# Patient Record
Sex: FEMALE | Race: WHITE | Employment: PART TIME | URBAN - METROPOLITAN AREA
[De-identification: names, ages, dates, MRNs, and addresses within clinical notes are randomized per-mention and may not be internally consistent; named-entity substitution may affect disease eponyms.]

---
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
## 2019-05-14 ENCOUNTER — APPOINTMENT (OUTPATIENT)
Dept: CT IMAGING | Age: 19
End: 2019-05-14

## 2019-05-14 ENCOUNTER — HOSPITAL ENCOUNTER (EMERGENCY)
Age: 19
Discharge: HOME | End: 2019-05-14

## 2019-05-14 VITALS
HEIGHT: 63 IN | SYSTOLIC BLOOD PRESSURE: 98 MMHG | RESPIRATION RATE: 16 BRPM | BODY MASS INDEX: 32.78 KG/M2 | WEIGHT: 185 LBS | TEMPERATURE: 98.3 F | DIASTOLIC BLOOD PRESSURE: 60 MMHG | HEART RATE: 95 BPM | OXYGEN SATURATION: 98 %

## 2019-05-14 DIAGNOSIS — R10.30 LOWER ABDOMINAL PAIN: Primary | ICD-10-CM

## 2019-05-14 DIAGNOSIS — I88.9 LYMPHADENITIS: ICD-10-CM

## 2019-05-14 LAB
ANION GAP SERPL CALCULATED.3IONS-SCNC: 11 MMOL/L (ref 7–16)
BACTERIA: ABNORMAL /HPF
BASOPHILS ABSOLUTE: 0.05 E9/L (ref 0–0.2)
BASOPHILS RELATIVE PERCENT: 0.6 % (ref 0–2)
BILIRUBIN URINE: NEGATIVE
BLOOD, URINE: NEGATIVE
BUN BLDV-MCNC: 10 MG/DL (ref 6–20)
CALCIUM SERPL-MCNC: 8.5 MG/DL (ref 8.6–10.2)
CHLORIDE BLD-SCNC: 103 MMOL/L (ref 98–107)
CLARITY: CLEAR
CO2: 26 MMOL/L (ref 22–29)
COLOR: YELLOW
CREAT SERPL-MCNC: 0.6 MG/DL (ref 0.5–1)
EOSINOPHILS ABSOLUTE: 0.09 E9/L (ref 0.05–0.5)
EOSINOPHILS RELATIVE PERCENT: 1.1 % (ref 0–6)
GFR AFRICAN AMERICAN: >60
GFR NON-AFRICAN AMERICAN: >60 ML/MIN/1.73
GLUCOSE BLD-MCNC: 103 MG/DL (ref 74–99)
GLUCOSE URINE: NEGATIVE MG/DL
HCG(URINE) PREGNANCY TEST: NEGATIVE
HCT VFR BLD CALC: 35.2 % (ref 34–48)
HEMOGLOBIN: 10.6 G/DL (ref 11.5–15.5)
IMMATURE GRANULOCYTES #: 0.03 E9/L
IMMATURE GRANULOCYTES %: 0.4 % (ref 0–5)
KETONES, URINE: NEGATIVE MG/DL
LEUKOCYTE ESTERASE, URINE: ABNORMAL
LIPASE: 39 U/L (ref 13–60)
LYMPHOCYTES ABSOLUTE: 1.83 E9/L (ref 1.5–4)
LYMPHOCYTES RELATIVE PERCENT: 23.4 % (ref 20–42)
MCH RBC QN AUTO: 20.9 PG (ref 26–35)
MCHC RBC AUTO-ENTMCNC: 30.1 % (ref 32–34.5)
MCV RBC AUTO: 69.6 FL (ref 80–99.9)
MONOCYTES ABSOLUTE: 0.77 E9/L (ref 0.1–0.95)
MONOCYTES RELATIVE PERCENT: 9.8 % (ref 2–12)
NEUTROPHILS ABSOLUTE: 5.06 E9/L (ref 1.8–7.3)
NEUTROPHILS RELATIVE PERCENT: 64.7 % (ref 43–80)
NITRITE, URINE: NEGATIVE
PDW BLD-RTO: 17 FL (ref 11.5–15)
PH UA: 7 (ref 5–9)
PLATELET # BLD: 310 E9/L (ref 130–450)
PMV BLD AUTO: 11.6 FL (ref 7–12)
POTASSIUM SERPL-SCNC: 3.4 MMOL/L (ref 3.5–5)
PROTEIN UA: NEGATIVE MG/DL
RBC # BLD: 5.06 E12/L (ref 3.5–5.5)
RBC UA: ABNORMAL /HPF (ref 0–2)
SODIUM BLD-SCNC: 140 MMOL/L (ref 132–146)
SPECIFIC GRAVITY UA: 1.01 (ref 1–1.03)
UROBILINOGEN, URINE: 1 E.U./DL
WBC # BLD: 7.8 E9/L (ref 4.5–11.5)
WBC UA: ABNORMAL /HPF (ref 0–5)

## 2019-05-14 PROCEDURE — 81025 URINE PREGNANCY TEST: CPT

## 2019-05-14 PROCEDURE — 80048 BASIC METABOLIC PNL TOTAL CA: CPT

## 2019-05-14 PROCEDURE — 74177 CT ABD & PELVIS W/CONTRAST: CPT

## 2019-05-14 PROCEDURE — 81001 URINALYSIS AUTO W/SCOPE: CPT

## 2019-05-14 PROCEDURE — 83690 ASSAY OF LIPASE: CPT

## 2019-05-14 PROCEDURE — 96374 THER/PROPH/DIAG INJ IV PUSH: CPT

## 2019-05-14 PROCEDURE — 6360000002 HC RX W HCPCS: Performed by: NURSE PRACTITIONER

## 2019-05-14 PROCEDURE — 85025 COMPLETE CBC W/AUTO DIFF WBC: CPT

## 2019-05-14 PROCEDURE — 99284 EMERGENCY DEPT VISIT MOD MDM: CPT

## 2019-05-14 PROCEDURE — 6360000004 HC RX CONTRAST MEDICATION: Performed by: RADIOLOGY

## 2019-05-14 PROCEDURE — 96375 TX/PRO/DX INJ NEW DRUG ADDON: CPT

## 2019-05-14 PROCEDURE — 36415 COLL VENOUS BLD VENIPUNCTURE: CPT

## 2019-05-14 RX ORDER — NAPROXEN 500 MG/1
500 TABLET ORAL 2 TIMES DAILY
Qty: 14 TABLET | Refills: 0 | Status: SHIPPED | OUTPATIENT
Start: 2019-05-14 | End: 2019-10-03

## 2019-05-14 RX ORDER — ONDANSETRON 2 MG/ML
4 INJECTION INTRAMUSCULAR; INTRAVENOUS ONCE
Status: COMPLETED | OUTPATIENT
Start: 2019-05-14 | End: 2019-05-14

## 2019-05-14 RX ORDER — HYDROCODONE BITARTRATE AND ACETAMINOPHEN 5; 325 MG/1; MG/1
1 TABLET ORAL EVERY 6 HOURS PRN
Qty: 10 TABLET | Refills: 0 | Status: SHIPPED | OUTPATIENT
Start: 2019-05-14 | End: 2019-05-17

## 2019-05-14 RX ORDER — KETOROLAC TROMETHAMINE 30 MG/ML
30 INJECTION, SOLUTION INTRAMUSCULAR; INTRAVENOUS ONCE
Status: COMPLETED | OUTPATIENT
Start: 2019-05-14 | End: 2019-05-14

## 2019-05-14 RX ORDER — ONDANSETRON 4 MG/1
4 TABLET, FILM COATED ORAL EVERY 8 HOURS PRN
Qty: 12 TABLET | Refills: 0 | Status: SHIPPED | OUTPATIENT
Start: 2019-05-14 | End: 2019-05-19

## 2019-05-14 RX ORDER — PREDNISONE 10 MG/1
40 TABLET ORAL DAILY
Qty: 20 TABLET | Refills: 0 | Status: SHIPPED | OUTPATIENT
Start: 2019-05-14 | End: 2019-05-19

## 2019-05-14 RX ORDER — DEXAMETHASONE SODIUM PHOSPHATE 10 MG/ML
10 INJECTION, SOLUTION INTRAMUSCULAR; INTRAVENOUS ONCE
Status: COMPLETED | OUTPATIENT
Start: 2019-05-14 | End: 2019-05-14

## 2019-05-14 RX ORDER — METRONIDAZOLE 500 MG/1
500 TABLET ORAL 3 TIMES DAILY
Qty: 30 TABLET | Refills: 0 | Status: SHIPPED | OUTPATIENT
Start: 2019-05-14 | End: 2019-05-24

## 2019-05-14 RX ADMIN — ONDANSETRON HYDROCHLORIDE 4 MG: 2 SOLUTION INTRAMUSCULAR; INTRAVENOUS at 21:13

## 2019-05-14 RX ADMIN — IOPAMIDOL 100 ML: 755 INJECTION, SOLUTION INTRAVENOUS at 21:49

## 2019-05-14 RX ADMIN — DEXAMETHASONE SODIUM PHOSPHATE 10 MG: 10 INJECTION, SOLUTION INTRAMUSCULAR; INTRAVENOUS at 21:13

## 2019-05-14 RX ADMIN — KETOROLAC TROMETHAMINE 30 MG: 30 INJECTION INTRAMUSCULAR; INTRAVENOUS at 21:13

## 2019-05-14 ASSESSMENT — PAIN DESCRIPTION - ONSET: ONSET: ON-GOING

## 2019-05-14 ASSESSMENT — PAIN DESCRIPTION - FREQUENCY: FREQUENCY: CONTINUOUS

## 2019-05-14 ASSESSMENT — PAIN DESCRIPTION - PROGRESSION: CLINICAL_PROGRESSION: NOT CHANGED

## 2019-05-14 ASSESSMENT — PAIN DESCRIPTION - DESCRIPTORS: DESCRIPTORS: CONSTANT;SHARP

## 2019-05-14 ASSESSMENT — PAIN DESCRIPTION - LOCATION: LOCATION: ABDOMEN

## 2019-05-14 ASSESSMENT — PAIN DESCRIPTION - PAIN TYPE: TYPE: ACUTE PAIN

## 2019-05-14 ASSESSMENT — PAIN DESCRIPTION - ORIENTATION: ORIENTATION: LOWER;RIGHT

## 2019-05-14 ASSESSMENT — PAIN - FUNCTIONAL ASSESSMENT: PAIN_FUNCTIONAL_ASSESSMENT: ACTIVITIES ARE NOT PREVENTED

## 2019-05-14 ASSESSMENT — PAIN SCALES - GENERAL
PAINLEVEL_OUTOF10: 8
PAINLEVEL_OUTOF10: 8

## 2019-05-15 NOTE — ED PROVIDER NOTES
[facility]    Department of Emergency Medicine   ED  Provider Note  Admit Date/RoomTime: 2019  8:33 PM  ED Room:   MRN: [id number]  Chief Complaint       Abdominal Pain (lower right per patient \"it's always been there\" it is now getting worse per patient)    History of Present Illness   Source of history provided by:  patient. History/Exam Limitations: none. Costa Carter is a 22 y.o. old female who has a past medical history of:   Past Medical History:   Diagnosis Date    Anemia affecting pregnancy in third trimester 10/11/2016    presents to the emergency department by private vehicle, for complaints of gradual onset, still present aching, cramping, shooting, stabbing pain in the RLQ without radiation which began 3 month(s) prior to arrival.   There has been similar episodes in the past .  Since onset the symptoms have been intermittent and persistent. The pain is associated with abdominal pain and nothing pertinent. The pain is aggravated by none and relieved by none and nothing. There has been NO back pain. GYN History: irregular periods. STD History: no history of PID, STD's. No LMP recorded. Patient has had an implant. Current Pregnancy: NA. Birth Control: None. Gravid Status:     . ROS   Pertinent positives and negatives are stated within HPI, all other systems reviewed and are negative. History reviewed. No pertinent surgical history. Social History:  reports that she has never smoked. She has never used smokeless tobacco. She reports that she does not drink alcohol or use drugs. Family History: family history is not on file. Allergies: Patient has no known allergies.     Physical Exam           ED Triage Vitals   BP Temp Temp Source Heart Rate Resp SpO2 Height Weight   19   128/86 99.8 °F (37.7 °C) Oral 134 12 99 % 5' 3\" (1.6 m) 185 lb (83.9 kg) Oxygen Saturation Interpretation: Normal.    · General Appearance/Constitutional:  Alert, development consistent with age. · HEENT:  NC/NT. PERRLA. Airway patent. · Neck:  Supple. No lymphadenopathy. · Respiratory:  No retractions. Lungs Clear to auscultation and breath sounds equal.  · CV:  Regular rate and rhythm. · GI:  General Appearance: normal.         Bowel sounds: normal bowel sounds. Distension:  None. Tenderness: No abdominal tenderness. Liver: non-palpable and non-tender. Spleen:  non-palpable and non-tender. Pulsatile Mass: absent. Hernia:  no inguinal or femoral hernias noted. · Back: CVA Tenderness: No.  · : (chaperone present during examination). deferred. · Integument:  Normal turgor. Warm, dry, without visible rash, unless noted elsewhere. · Lymphatics: No edema, cap.refill <3sec. · Neurological:  Orientation age-appropriate. Motor functions intact.     Lab / Imaging Results   (All laboratory and radiology results have been personally reviewed by myself)  Labs:  Results for orders placed or performed during the hospital encounter of 05/14/19   Urinalysis   Result Value Ref Range    Color, UA Yellow Straw/Yellow    Clarity, UA Clear Clear    Glucose, Ur Negative Negative mg/dL    Bilirubin Urine Negative Negative    Ketones, Urine Negative Negative mg/dL    Specific Gravity, UA 1.015 1.005 - 1.030    Blood, Urine Negative Negative    pH, UA 7.0 5.0 - 9.0    Protein, UA Negative Negative mg/dL    Urobilinogen, Urine 1.0 <2.0 E.U./dL    Nitrite, Urine Negative Negative    Leukocyte Esterase, Urine SMALL (A) Negative   Pregnancy, Urine   Result Value Ref Range    HCG(Urine) Pregnancy Test NEGATIVE NEGATIVE   CBC Auto Differential   Result Value Ref Range    WBC 7.8 4.5 - 11.5 E9/L    RBC 5.06 3.50 - 5.50 E12/L    Hemoglobin 10.6 (L) 11.5 - 15.5 g/dL    Hematocrit 35.2 34.0 - 48.0 %    MCV 69.6 (L) 80.0 - 99.9 2113)   ondansetron [facility] injection 4 mg (4 mg Intravenous Given 5/14/19 2113)   iopamidol (ISOVUE-370) 76 % injection 100 mL (100 mLs Intravenous Given 5/14/19 2149)        Re-examination:  5/14/19       Time: 194    Patients symptoms are improving. Consults:   None    Procedures:   none    MDM:   Patient was treated for her abdominal pain and hydrated with IV fluid. Labs were unremarkable and a CT of the abdomen revealed an enteritis which was confirmed with the patient's symptoms of diarrhea and nausea. She is stable for discharge home and can follow up with her PCP. Counseling: The emergency provider has spoken with the patient and discussed todays results, in addition to providing specific details for the plan of care and counseling regarding the diagnosis and prognosis. Questions are answered at this time and they are agreeable with the plan . Assessment      1. Lower abdominal pain    2. Lymphadenitis      Plan   Discharge to home  Patient condition is stable    New Medications     Discharge Medication List as of 5/14/2019 10:22 PM      START taking these medications    Details   predniSONE (DELTASONE) 10 MG tablet Take 4 tablets by mouth daily for 5 days, Disp-20 tablet, R-0Print      metroNIDAZOLE (FLAGYL) 500 MG tablet Take 1 tablet by mouth 3 times daily for 10 days, Disp-30 tablet, R-0Print      HYDROcodone-acetaminophen (NORCO) 5-325 MG per tablet Take 1 tablet by mouth every 6 hours as needed for Pain for up to 3 days. Intended supply: 3 days.  Take lowest dose possible to manage pain, Disp-10 tablet, R-0Print      naproxen (NAPROSYN) 500 MG tablet Take 1 tablet by mouth 2 times daily for 7 days, Disp-14 tablet, R-0Print      ondansetron (ZOFRAN) 4 MG tablet Take 1 tablet by mouth every 8 hours as needed for Nausea or Vomiting, Disp-12 tablet, R-0Print           Electronically signed by JAMEEL Castillo CNP   DD: 5/14/19  **This report was transcribed using voice recognition software. Every effort was made to ensure accuracy; however, inadvertent computerized transcription errors may be present.   END OF ED PROVIDER NOTE      JAMEEL Greco - CNP  05/14/19 [address] [place] [address], JAMEEL - CNP  05/14/19 [id number]

## 2019-05-27 ENCOUNTER — HOSPITAL ENCOUNTER (EMERGENCY)
Age: 19
Discharge: HOME | End: 2019-05-27
Attending: EMERGENCY MEDICINE

## 2019-05-27 VITALS
HEART RATE: 102 BPM | TEMPERATURE: 99 F | RESPIRATION RATE: 16 BRPM | OXYGEN SATURATION: 98 % | HEIGHT: 63 IN | WEIGHT: 195 LBS | SYSTOLIC BLOOD PRESSURE: 112 MMHG | BODY MASS INDEX: 34.55 KG/M2 | DIASTOLIC BLOOD PRESSURE: 70 MMHG

## 2019-05-27 DIAGNOSIS — N30.00 ACUTE CYSTITIS WITHOUT HEMATURIA: ICD-10-CM

## 2019-05-27 DIAGNOSIS — R30.0 DYSURIA: Primary | ICD-10-CM

## 2019-05-27 DIAGNOSIS — K62.5 HEMORRHAGE OF RECTUM AND ANUS: ICD-10-CM

## 2019-05-27 LAB
BACTERIA: ABNORMAL /HPF
BILIRUBIN URINE: NEGATIVE
BLOOD, URINE: ABNORMAL
CLARITY: ABNORMAL
COLOR: YELLOW
EPITHELIAL CELLS, UA: ABNORMAL /HPF
GLUCOSE URINE: NEGATIVE MG/DL
HCG(URINE) PREGNANCY TEST: NEGATIVE
KETONES, URINE: NEGATIVE MG/DL
LEUKOCYTE ESTERASE, URINE: ABNORMAL
NITRITE, URINE: NEGATIVE
PH UA: 6 (ref 5–9)
PROTEIN UA: NEGATIVE MG/DL
RBC UA: ABNORMAL /HPF (ref 0–2)
SPECIFIC GRAVITY UA: 1.02 (ref 1–1.03)
UROBILINOGEN, URINE: 1 E.U./DL
WBC UA: ABNORMAL /HPF (ref 0–5)

## 2019-05-27 PROCEDURE — 87088 URINE BACTERIA CULTURE: CPT

## 2019-05-27 PROCEDURE — 81001 URINALYSIS AUTO W/SCOPE: CPT

## 2019-05-27 PROCEDURE — 99283 EMERGENCY DEPT VISIT LOW MDM: CPT

## 2019-05-27 PROCEDURE — 81025 URINE PREGNANCY TEST: CPT

## 2019-05-27 RX ORDER — NITROFURANTOIN 25; 75 MG/1; MG/1
100 CAPSULE ORAL 2 TIMES DAILY
Qty: 6 CAPSULE | Refills: 0 | Status: SHIPPED | OUTPATIENT
Start: 2019-05-27 | End: 2019-05-30

## 2019-05-27 ASSESSMENT — PAIN DESCRIPTION - PAIN TYPE: TYPE: ACUTE PAIN

## 2019-05-27 ASSESSMENT — PAIN DESCRIPTION - ORIENTATION: ORIENTATION: RIGHT;LEFT;LOWER

## 2019-05-27 ASSESSMENT — PAIN SCALES - GENERAL: PAINLEVEL_OUTOF10: 8

## 2019-05-27 ASSESSMENT — PAIN DESCRIPTION - LOCATION: LOCATION: ABDOMEN

## 2019-05-27 ASSESSMENT — PAIN DESCRIPTION - DESCRIPTORS: DESCRIPTORS: CRAMPING

## 2019-05-27 ASSESSMENT — PAIN DESCRIPTION - FREQUENCY: FREQUENCY: CONTINUOUS

## 2019-05-27 ASSESSMENT — PAIN DESCRIPTION - PROGRESSION: CLINICAL_PROGRESSION: GRADUALLY WORSENING

## 2019-05-27 NOTE — ED NOTES
Assisted Dr. Deion Shearer with rectal exam. Tolerated well. Hemoccult positive. Dr. Deion Shearer notified.       Bebe Fang RN  05/27/19 [id number]

## 2019-05-27 NOTE — ED PROVIDER NOTES
Normal      ---------------------------------------------------PHYSICAL EXAM--------------------------------------      Constitutional/General: Alert and oriented x3, well appearing, non toxic in NAD  Head: NC/AT  Eyes: PERRL, EOMI    Neck: Supple, full ROM, no meningeal signs  Pulmonary: Lungs clear to auscultation bilaterally, no wheezes, rales, or rhonchi. Not in respiratory distress  Cardiovascular:  Regular rate and rhythm, no murmurs, gallops, or rubs. 2+ distal pulses  Abdomen: Soft, non tender, non distended, rectal exam showed brown stool that tested heme positive and no active bleeding was noted  Extremities: Moves all extremities x 4. Warm and well perfused  Skin: warm and dry without rash  Neurologic: GCS 15,  Psych: Normal Affect      ------------------------------ ED COURSE/MEDICAL DECISION MAKING----------------------  Medications - No data to display      Medical Decision Making:          Counseling: The emergency provider has spoken with the patient and discussed todays results, in addition to providing specific details for the plan of care and counseling regarding the diagnosis and prognosis. Questions are answered at this time and they are agreeable with the plan.      --------------------------------- IMPRESSION AND DISPOSITION ---------------------------------    IMPRESSION  1. Dysuria    2. Acute cystitis without hematuria    3.  Hemorrhage of rectum and anus        DISPOSITION  Disposition: Discharge to home  Patient condition is stable                  Quin Maldonado MD  05/27/19 [id number]

## 2019-05-30 LAB — URINE CULTURE, ROUTINE: NORMAL

## 2019-10-03 ENCOUNTER — HOSPITAL ENCOUNTER (EMERGENCY)
Age: 19
Discharge: HOME | End: 2019-10-03
Attending: FAMILY MEDICINE

## 2019-10-03 VITALS
RESPIRATION RATE: 16 BRPM | WEIGHT: 195 LBS | HEIGHT: 63 IN | OXYGEN SATURATION: 100 % | BODY MASS INDEX: 34.55 KG/M2 | HEART RATE: 96 BPM | TEMPERATURE: 98.3 F | SYSTOLIC BLOOD PRESSURE: 122 MMHG | DIASTOLIC BLOOD PRESSURE: 76 MMHG

## 2019-10-03 DIAGNOSIS — S20.161A: Primary | ICD-10-CM

## 2019-10-03 DIAGNOSIS — N61.0 CELLULITIS OF RIGHT BREAST: ICD-10-CM
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
## 2019-10-03 PROCEDURE — 6370000000 HC RX 637 (ALT 250 FOR IP): Performed by: FAMILY MEDICINE

## 2019-10-03 PROCEDURE — 2500000003 HC RX 250 WO HCPCS

## 2019-10-03 PROCEDURE — 99281 EMR DPT VST MAYX REQ PHY/QHP: CPT

## 2019-10-03 RX ORDER — DIAPER,BRIEF,INFANT-TODD,DISP
EACH MISCELLANEOUS ONCE
Status: COMPLETED | OUTPATIENT
Start: 2019-10-03 | End: 2019-10-03

## 2019-10-03 RX ORDER — LIDOCAINE HYDROCHLORIDE AND EPINEPHRINE 10; 10 MG/ML; UG/ML
INJECTION, SOLUTION INFILTRATION; PERINEURAL
Status: COMPLETED
Start: 2019-10-03 | End: 2019-10-03

## 2019-10-03 RX ORDER — SULFAMETHOXAZOLE AND TRIMETHOPRIM 800; 160 MG/1; MG/1
1 TABLET ORAL 2 TIMES DAILY
Qty: 20 TABLET | Refills: 0 | Status: SHIPPED | OUTPATIENT
Start: 2019-10-03 | End: 2019-10-13

## 2019-10-03 RX ADMIN — LIDOCAINE HYDROCHLORIDE,EPINEPHRINE BITARTRATE 20 ML: 10; .01 INJECTION, SOLUTION INFILTRATION; PERINEURAL at 11:23

## 2019-10-03 RX ADMIN — BACITRACIN ZINC: 500 OINTMENT TOPICAL at 11:23

## 2019-10-03 ASSESSMENT — PAIN DESCRIPTION - ONSET: ONSET: SUDDEN

## 2019-10-03 ASSESSMENT — PAIN DESCRIPTION - PAIN TYPE: TYPE: ACUTE PAIN

## 2019-10-03 ASSESSMENT — PAIN DESCRIPTION - ORIENTATION: ORIENTATION: RIGHT

## 2019-10-03 ASSESSMENT — PAIN DESCRIPTION - PROGRESSION: CLINICAL_PROGRESSION: NOT CHANGED

## 2019-10-03 ASSESSMENT — PAIN DESCRIPTION - FREQUENCY: FREQUENCY: CONTINUOUS

## 2019-10-03 ASSESSMENT — PAIN SCALES - GENERAL: PAINLEVEL_OUTOF10: 6

## 2019-10-03 ASSESSMENT — PAIN DESCRIPTION - LOCATION: LOCATION: BREAST

## 2019-10-03 ASSESSMENT — PAIN DESCRIPTION - DESCRIPTORS: DESCRIPTORS: ACHING;CONSTANT;DISCOMFORT

## 2019-11-15 ENCOUNTER — HOSPITAL ENCOUNTER (INPATIENT)
Age: 19
LOS: 5 days | Discharge: HOME | DRG: 885 | End: 2019-11-20
Attending: EMERGENCY MEDICINE | Admitting: PSYCHIATRY & NEUROLOGY
Payer: MEDICAID

## 2019-11-15 DIAGNOSIS — T14.91XA SUICIDAL BEHAVIOR WITH ATTEMPTED SELF-INJURY (HCC): Primary | ICD-10-CM

## 2019-11-15 PROBLEM — R45.851 DEPRESSION WITH SUICIDAL IDEATION: Status: ACTIVE | Noted: 2019-11-15

## 2019-11-15 PROBLEM — F32.A DEPRESSION WITH SUICIDAL IDEATION: Status: ACTIVE | Noted: 2019-11-15

## 2019-11-15 LAB
ACETAMINOPHEN LEVEL: <5 MCG/ML (ref 10–30)
ALBUMIN SERPL-MCNC: 3.7 G/DL (ref 3.5–5.2)
ALP BLD-CCNC: 72 U/L (ref 35–104)
ALT SERPL-CCNC: 12 U/L (ref 0–32)
AMPHETAMINE SCREEN, URINE: NOT DETECTED
ANION GAP SERPL CALCULATED.3IONS-SCNC: 9 MMOL/L (ref 7–16)
ANISOCYTOSIS: ABNORMAL
AST SERPL-CCNC: 13 U/L (ref 0–31)
BARBITURATE SCREEN URINE: NOT DETECTED
BASOPHILS ABSOLUTE: 0 E9/L (ref 0–0.2)
BASOPHILS RELATIVE PERCENT: 0.5 % (ref 0–2)
BENZODIAZEPINE SCREEN, URINE: NOT DETECTED
BILIRUB SERPL-MCNC: 0.6 MG/DL (ref 0–1.2)
BUN BLDV-MCNC: 8 MG/DL (ref 6–20)
CALCIUM SERPL-MCNC: 8.9 MG/DL (ref 8.6–10.2)
CANNABINOID SCREEN URINE: POSITIVE
CHLORIDE BLD-SCNC: 107 MMOL/L (ref 98–107)
CO2: 24 MMOL/L (ref 22–29)
COCAINE METABOLITE SCREEN URINE: NOT DETECTED
CREAT SERPL-MCNC: 0.6 MG/DL (ref 0.5–1)
EOSINOPHILS ABSOLUTE: 0.25 E9/L (ref 0.05–0.5)
EOSINOPHILS RELATIVE PERCENT: 4.4 % (ref 0–6)
ETHANOL: <10 MG/DL (ref 0–0.08)
FENTANYL SCREEN, URINE: NOT DETECTED
GFR AFRICAN AMERICAN: >60
GFR NON-AFRICAN AMERICAN: >60 ML/MIN/1.73
GLUCOSE BLD-MCNC: 91 MG/DL (ref 74–99)
HCG(URINE) PREGNANCY TEST: NEGATIVE
HCT VFR BLD CALC: 34.4 % (ref 34–48)
HEMOGLOBIN: 9.8 G/DL (ref 11.5–15.5)
HYPOCHROMIA: ABNORMAL
LYMPHOCYTES ABSOLUTE: 1.88 E9/L (ref 1.5–4)
LYMPHOCYTES RELATIVE PERCENT: 32.5 % (ref 20–42)
Lab: ABNORMAL
MCH RBC QN AUTO: 19.2 PG (ref 26–35)
MCHC RBC AUTO-ENTMCNC: 28.5 % (ref 32–34.5)
MCV RBC AUTO: 67.3 FL (ref 80–99.9)
METHADONE SCREEN, URINE: NOT DETECTED
MONOCYTES ABSOLUTE: 0.23 E9/L (ref 0.1–0.95)
MONOCYTES RELATIVE PERCENT: 4.4 % (ref 2–12)
NEUTROPHILS ABSOLUTE: 3.36 E9/L (ref 1.8–7.3)
NEUTROPHILS RELATIVE PERCENT: 58.8 % (ref 43–80)
OPIATE SCREEN URINE: NOT DETECTED
OVALOCYTES: ABNORMAL
OXYCODONE URINE: NOT DETECTED
PDW BLD-RTO: 18.5 FL (ref 11.5–15)
PHENCYCLIDINE SCREEN URINE: NOT DETECTED
PLATELET # BLD: 377 E9/L (ref 130–450)
PMV BLD AUTO: 11.5 FL (ref 7–12)
POIKILOCYTES: ABNORMAL
POLYCHROMASIA: ABNORMAL
POTASSIUM REFLEX MAGNESIUM: 3.9 MMOL/L (ref 3.5–5)
RBC # BLD: 5.11 E12/L (ref 3.5–5.5)
SALICYLATE, SERUM: <0.3 MG/DL (ref 0–30)
SODIUM BLD-SCNC: 140 MMOL/L (ref 132–146)
TOTAL PROTEIN: 6.3 G/DL (ref 6.4–8.3)
TRICYCLIC ANTIDEPRESSANTS SCREEN SERUM: NEGATIVE NG/ML
WBC # BLD: 5.7 E9/L (ref 4.5–11.5)

## 2019-11-15 PROCEDURE — 1240000000 HC EMOTIONAL WELLNESS R&B

## 2019-11-15 PROCEDURE — G0480 DRUG TEST DEF 1-7 CLASSES: HCPCS

## 2019-11-15 PROCEDURE — 99285 EMERGENCY DEPT VISIT HI MDM: CPT

## 2019-11-15 PROCEDURE — 80053 COMPREHEN METABOLIC PANEL: CPT

## 2019-11-15 PROCEDURE — 85025 COMPLETE CBC W/AUTO DIFF WBC: CPT

## 2019-11-15 PROCEDURE — 36415 COLL VENOUS BLD VENIPUNCTURE: CPT

## 2019-11-15 PROCEDURE — 81025 URINE PREGNANCY TEST: CPT

## 2019-11-15 PROCEDURE — 80307 DRUG TEST PRSMV CHEM ANLYZR: CPT

## 2019-11-15 RX ORDER — MAGNESIUM HYDROXIDE/ALUMINUM HYDROXICE/SIMETHICONE 120; 1200; 1200 MG/30ML; MG/30ML; MG/30ML
30 SUSPENSION ORAL PRN
Status: DISCONTINUED | OUTPATIENT
Start: 2019-11-15 | End: 2019-11-20 | Stop reason: HOSPADM

## 2019-11-15 RX ORDER — BENZTROPINE MESYLATE 1 MG/ML
2 INJECTION INTRAMUSCULAR; INTRAVENOUS 2 TIMES DAILY PRN
Status: DISCONTINUED | OUTPATIENT
Start: 2019-11-15 | End: 2019-11-20 | Stop reason: HOSPADM

## 2019-11-15 RX ORDER — OLANZAPINE 5 MG/1
5 TABLET ORAL EVERY 4 HOURS PRN
Status: DISCONTINUED | OUTPATIENT
Start: 2019-11-15 | End: 2019-11-20 | Stop reason: HOSPADM

## 2019-11-15 RX ORDER — OLANZAPINE 10 MG/1
10 INJECTION, POWDER, LYOPHILIZED, FOR SOLUTION INTRAMUSCULAR EVERY 4 HOURS PRN
Status: DISCONTINUED | OUTPATIENT
Start: 2019-11-15 | End: 2019-11-20 | Stop reason: HOSPADM

## 2019-11-15 RX ORDER — HYDROXYZINE PAMOATE 50 MG/1
50 CAPSULE ORAL 3 TIMES DAILY PRN
Status: DISCONTINUED | OUTPATIENT
Start: 2019-11-15 | End: 2019-11-20 | Stop reason: HOSPADM

## 2019-11-15 RX ORDER — ACETAMINOPHEN 325 MG/1
650 TABLET ORAL EVERY 4 HOURS PRN
Status: DISCONTINUED | OUTPATIENT
Start: 2019-11-15 | End: 2019-11-20 | Stop reason: HOSPADM

## 2019-11-15 RX ORDER — TRAZODONE HYDROCHLORIDE 50 MG/1
50 TABLET ORAL NIGHTLY PRN
Status: DISCONTINUED | OUTPATIENT
Start: 2019-11-15 | End: 2019-11-20 | Stop reason: HOSPADM

## 2019-11-15 ASSESSMENT — ENCOUNTER SYMPTOMS
VOMITING: 0
SHORTNESS OF BREATH: 0
EYE PAIN: 0
WHEEZING: 0
DIARRHEA: 0
COUGH: 0
EYE REDNESS: 0
SORE THROAT: 0
ABDOMINAL DISTENTION: 0
NAUSEA: 0
SINUS PRESSURE: 0
EYE DISCHARGE: 0
BACK PAIN: 0

## 2019-11-15 ASSESSMENT — SLEEP AND FATIGUE QUESTIONNAIRES
AVERAGE NUMBER OF SLEEP HOURS: 5
DO YOU USE A SLEEP AID: YES
SLEEP PATTERN: DIFFICULTY FALLING ASLEEP;RESTLESSNESS;DISTURBED/INTERRUPTED SLEEP
DIFFICULTY FALLING ASLEEP: YES
DIFFICULTY ARISING: YES
DIFFICULTY STAYING ASLEEP: YES
RESTFUL SLEEP: NO
DO YOU HAVE DIFFICULTY SLEEPING: YES

## 2019-11-15 ASSESSMENT — PATIENT HEALTH QUESTIONNAIRE - PHQ9: SUM OF ALL RESPONSES TO PHQ QUESTIONS 1-9: 18

## 2019-11-15 ASSESSMENT — LIFESTYLE VARIABLES: HISTORY_ALCOHOL_USE: NO

## 2019-11-16 PROBLEM — F33.3 SEVERE EPISODE OF RECURRENT MAJOR DEPRESSIVE DISORDER, WITH PSYCHOTIC FEATURES (HCC): Status: ACTIVE | Noted: 2019-11-16

## 2019-11-16 PROBLEM — F32.9 MAJOR DEPRESSIVE DISORDER, SINGLE EPISODE: Status: ACTIVE | Noted: 2019-11-16

## 2019-11-16 PROCEDURE — 6370000000 HC RX 637 (ALT 250 FOR IP): Performed by: PSYCHIATRY & NEUROLOGY

## 2019-11-16 PROCEDURE — 1240000000 HC EMOTIONAL WELLNESS R&B

## 2019-11-16 PROCEDURE — 99222 1ST HOSP IP/OBS MODERATE 55: CPT | Performed by: NURSE PRACTITIONER

## 2019-11-16 PROCEDURE — 6370000000 HC RX 637 (ALT 250 FOR IP): Performed by: NURSE PRACTITIONER

## 2019-11-16 RX ORDER — PAROXETINE 10 MG/1
10 TABLET, FILM COATED ORAL DAILY
Status: DISCONTINUED | OUTPATIENT
Start: 2019-11-16 | End: 2019-11-17

## 2019-11-16 RX ADMIN — TRAZODONE HYDROCHLORIDE 50 MG: 50 TABLET ORAL at 20:26

## 2019-11-16 RX ADMIN — ACETAMINOPHEN 650 MG: 325 TABLET, FILM COATED ORAL at 16:15

## 2019-11-16 RX ADMIN — HYDROXYZINE PAMOATE 50 MG: 50 CAPSULE ORAL at 20:26

## 2019-11-16 RX ADMIN — PAROXETINE 10 MG: 10 TABLET, FILM COATED ORAL at 12:49

## 2019-11-16 ASSESSMENT — PATIENT HEALTH QUESTIONNAIRE - PHQ9: SUM OF ALL RESPONSES TO PHQ QUESTIONS 1-9: 21

## 2019-11-16 ASSESSMENT — SLEEP AND FATIGUE QUESTIONNAIRES
RESTFUL SLEEP: NO
SLEEP PATTERN: DIFFICULTY FALLING ASLEEP
DO YOU HAVE DIFFICULTY SLEEPING: YES
DIFFICULTY ARISING: YES
AVERAGE NUMBER OF SLEEP HOURS: 5
DIFFICULTY STAYING ASLEEP: YES
DIFFICULTY FALLING ASLEEP: YES
DO YOU USE A SLEEP AID: YES

## 2019-11-16 ASSESSMENT — PAIN SCALES - GENERAL: PAINLEVEL_OUTOF10: 4

## 2019-11-16 ASSESSMENT — LIFESTYLE VARIABLES: HISTORY_ALCOHOL_USE: NO

## 2019-11-17 LAB
CHOLESTEROL, TOTAL: 122 MG/DL (ref 0–199)
HBA1C MFR BLD: 5 % (ref 4–5.6)
HDLC SERPL-MCNC: 32 MG/DL
LDL CHOLESTEROL CALCULATED: 57 MG/DL (ref 0–99)
TRIGL SERPL-MCNC: 164 MG/DL (ref 0–149)
VLDLC SERPL CALC-MCNC: 33 MG/DL

## 2019-11-17 PROCEDURE — 36415 COLL VENOUS BLD VENIPUNCTURE: CPT

## 2019-11-17 PROCEDURE — 83036 HEMOGLOBIN GLYCOSYLATED A1C: CPT

## 2019-11-17 PROCEDURE — 6370000000 HC RX 637 (ALT 250 FOR IP): Performed by: PSYCHIATRY & NEUROLOGY

## 2019-11-17 PROCEDURE — 1240000000 HC EMOTIONAL WELLNESS R&B

## 2019-11-17 PROCEDURE — 6370000000 HC RX 637 (ALT 250 FOR IP): Performed by: NURSE PRACTITIONER

## 2019-11-17 PROCEDURE — 99232 SBSQ HOSP IP/OBS MODERATE 35: CPT | Performed by: NURSE PRACTITIONER

## 2019-11-17 PROCEDURE — 80061 LIPID PANEL: CPT

## 2019-11-17 RX ORDER — PAROXETINE HYDROCHLORIDE 20 MG/1
20 TABLET, FILM COATED ORAL DAILY
Status: DISCONTINUED | OUTPATIENT
Start: 2019-11-18 | End: 2019-11-20 | Stop reason: HOSPADM

## 2019-11-17 RX ADMIN — PAROXETINE 10 MG: 10 TABLET, FILM COATED ORAL at 09:30

## 2019-11-17 RX ADMIN — TRAZODONE HYDROCHLORIDE 50 MG: 50 TABLET ORAL at 21:06

## 2019-11-17 RX ADMIN — ALUMINUM HYDROXIDE, MAGNESIUM HYDROXIDE, AND SIMETHICONE 30 ML: 200; 200; 20 SUSPENSION ORAL at 15:44

## 2019-11-17 RX ADMIN — HYDROXYZINE PAMOATE 50 MG: 50 CAPSULE ORAL at 21:06

## 2019-11-18 PROCEDURE — 99231 SBSQ HOSP IP/OBS SF/LOW 25: CPT | Performed by: NURSE PRACTITIONER

## 2019-11-18 PROCEDURE — 6370000000 HC RX 637 (ALT 250 FOR IP): Performed by: PSYCHIATRY & NEUROLOGY

## 2019-11-18 PROCEDURE — 6370000000 HC RX 637 (ALT 250 FOR IP): Performed by: NURSE PRACTITIONER

## 2019-11-18 PROCEDURE — 1240000000 HC EMOTIONAL WELLNESS R&B

## 2019-11-18 RX ADMIN — ALUMINUM HYDROXIDE, MAGNESIUM HYDROXIDE, AND SIMETHICONE 30 ML: 200; 200; 20 SUSPENSION ORAL at 10:23

## 2019-11-18 RX ADMIN — ALUMINUM HYDROXIDE, MAGNESIUM HYDROXIDE, AND SIMETHICONE 30 ML: 200; 200; 20 SUSPENSION ORAL at 16:12

## 2019-11-18 RX ADMIN — ALUMINUM HYDROXIDE, MAGNESIUM HYDROXIDE, AND SIMETHICONE 30 ML: 200; 200; 20 SUSPENSION ORAL at 23:54

## 2019-11-18 RX ADMIN — PAROXETINE HYDROCHLORIDE 20 MG: 20 TABLET, FILM COATED ORAL at 08:57

## 2019-11-18 RX ADMIN — TRAZODONE HYDROCHLORIDE 50 MG: 50 TABLET ORAL at 20:12

## 2019-11-18 RX ADMIN — HYDROXYZINE PAMOATE 50 MG: 50 CAPSULE ORAL at 20:12

## 2019-11-18 ASSESSMENT — PAIN SCALES - GENERAL
PAINLEVEL_OUTOF10: 0

## 2019-11-19 PROCEDURE — 6370000000 HC RX 637 (ALT 250 FOR IP): Performed by: NURSE PRACTITIONER

## 2019-11-19 PROCEDURE — 1240000000 HC EMOTIONAL WELLNESS R&B

## 2019-11-19 PROCEDURE — 99232 SBSQ HOSP IP/OBS MODERATE 35: CPT | Performed by: NURSE PRACTITIONER

## 2019-11-19 PROCEDURE — 6370000000 HC RX 637 (ALT 250 FOR IP): Performed by: PSYCHIATRY & NEUROLOGY

## 2019-11-19 RX ORDER — ARIPIPRAZOLE 2 MG/1
2 TABLET ORAL 2 TIMES DAILY
Qty: 30 TABLET | Refills: 0 | Status: SHIPPED | OUTPATIENT
Start: 2019-11-19 | End: 2020-11-05

## 2019-11-19 RX ORDER — PAROXETINE HYDROCHLORIDE 20 MG/1
20 TABLET, FILM COATED ORAL DAILY
Qty: 30 TABLET | Refills: 0 | Status: SHIPPED | OUTPATIENT
Start: 2019-11-20 | End: 2020-11-05

## 2019-11-19 RX ORDER — HYDROXYZINE PAMOATE 50 MG/1
50 CAPSULE ORAL 3 TIMES DAILY PRN
Qty: 30 CAPSULE | Refills: 0 | Status: SHIPPED | OUTPATIENT
Start: 2019-11-19 | End: 2019-11-29

## 2019-11-19 RX ORDER — ARIPIPRAZOLE 2 MG/1
2 TABLET ORAL 2 TIMES DAILY
Status: DISCONTINUED | OUTPATIENT
Start: 2019-11-19 | End: 2019-11-20 | Stop reason: HOSPADM

## 2019-11-19 RX ORDER — TRAZODONE HYDROCHLORIDE 50 MG/1
50 TABLET ORAL NIGHTLY PRN
Qty: 15 TABLET | Refills: 0 | Status: SHIPPED | OUTPATIENT
Start: 2019-11-19 | End: 2020-11-05

## 2019-11-19 RX ADMIN — TRAZODONE HYDROCHLORIDE 50 MG: 50 TABLET ORAL at 21:54

## 2019-11-19 RX ADMIN — PAROXETINE HYDROCHLORIDE 20 MG: 20 TABLET, FILM COATED ORAL at 08:33

## 2019-11-19 RX ADMIN — ACETAMINOPHEN 650 MG: 325 TABLET, FILM COATED ORAL at 13:03

## 2019-11-19 RX ADMIN — ARIPIPRAZOLE 2 MG: 2 TABLET ORAL at 21:54

## 2019-11-19 RX ADMIN — ARIPIPRAZOLE 2 MG: 2 TABLET ORAL at 09:46

## 2019-11-19 ASSESSMENT — PAIN DESCRIPTION - DESCRIPTORS: DESCRIPTORS: ACHING

## 2019-11-19 ASSESSMENT — PAIN SCALES - GENERAL
PAINLEVEL_OUTOF10: 0
PAINLEVEL_OUTOF10: 9
PAINLEVEL_OUTOF10: 0

## 2019-11-19 ASSESSMENT — PAIN DESCRIPTION - ONSET: ONSET: ON-GOING

## 2019-11-19 ASSESSMENT — PAIN DESCRIPTION - LOCATION: LOCATION: HIP

## 2019-11-19 ASSESSMENT — PAIN DESCRIPTION - PAIN TYPE: TYPE: ACUTE PAIN

## 2019-11-19 ASSESSMENT — PAIN DESCRIPTION - ORIENTATION: ORIENTATION: LEFT

## 2019-11-19 ASSESSMENT — PAIN DESCRIPTION - PROGRESSION: CLINICAL_PROGRESSION: NOT CHANGED

## 2019-11-20 VITALS
HEIGHT: 63 IN | DIASTOLIC BLOOD PRESSURE: 79 MMHG | BODY MASS INDEX: 31.89 KG/M2 | WEIGHT: 180 LBS | TEMPERATURE: 98.8 F | HEART RATE: 90 BPM | OXYGEN SATURATION: 100 % | SYSTOLIC BLOOD PRESSURE: 109 MMHG | RESPIRATION RATE: 15 BRPM

## 2019-11-20 PROCEDURE — 6370000000 HC RX 637 (ALT 250 FOR IP): Performed by: NURSE PRACTITIONER

## 2019-11-20 PROCEDURE — 99232 SBSQ HOSP IP/OBS MODERATE 35: CPT | Performed by: PSYCHIATRY & NEUROLOGY

## 2019-11-20 PROCEDURE — 6370000000 HC RX 637 (ALT 250 FOR IP): Performed by: PSYCHIATRY & NEUROLOGY

## 2019-11-20 RX ADMIN — ARIPIPRAZOLE 2 MG: 2 TABLET ORAL at 08:46

## 2019-11-20 RX ADMIN — PAROXETINE HYDROCHLORIDE 20 MG: 20 TABLET, FILM COATED ORAL at 08:46

## 2019-11-20 ASSESSMENT — PAIN SCALES - GENERAL: PAINLEVEL_OUTOF10: 0

## 2020-10-01 ENCOUNTER — HOSPITAL ENCOUNTER (EMERGENCY)
Age: 20
Discharge: HOME | End: 2020-10-01
Payer: MEDICARE

## 2020-10-01 VITALS
WEIGHT: 189 LBS | RESPIRATION RATE: 18 BRPM | DIASTOLIC BLOOD PRESSURE: 88 MMHG | BODY MASS INDEX: 33.49 KG/M2 | HEART RATE: 120 BPM | OXYGEN SATURATION: 98 % | HEIGHT: 63 IN | TEMPERATURE: 97.8 F | SYSTOLIC BLOOD PRESSURE: 141 MMHG

## 2020-10-01 LAB
ALBUMIN SERPL-MCNC: 3.5 G/DL (ref 3.5–5.2)
ALP BLD-CCNC: 52 U/L (ref 35–104)
ALT SERPL-CCNC: 9 U/L (ref 0–32)
ANION GAP SERPL CALCULATED.3IONS-SCNC: 12 MMOL/L (ref 7–16)
AST SERPL-CCNC: 12 U/L (ref 0–31)
BACTERIA: ABNORMAL /HPF
BASOPHILS ABSOLUTE: 0.04 E9/L (ref 0–0.2)
BASOPHILS RELATIVE PERCENT: 0.4 % (ref 0–2)
BILIRUB SERPL-MCNC: 0.4 MG/DL (ref 0–1.2)
BILIRUBIN URINE: NEGATIVE
BLOOD, URINE: NEGATIVE
BUN BLDV-MCNC: 6 MG/DL (ref 6–20)
CALCIUM SERPL-MCNC: 8.6 MG/DL (ref 8.6–10.2)
CHLORIDE BLD-SCNC: 103 MMOL/L (ref 98–107)
CLARITY: CLEAR
CO2: 21 MMOL/L (ref 22–29)
COLOR: YELLOW
CREAT SERPL-MCNC: 0.5 MG/DL (ref 0.5–1)
EOSINOPHILS ABSOLUTE: 0.05 E9/L (ref 0.05–0.5)
EOSINOPHILS RELATIVE PERCENT: 0.5 % (ref 0–6)
EPITHELIAL CELLS, UA: ABNORMAL /HPF
GFR AFRICAN AMERICAN: >60
GFR NON-AFRICAN AMERICAN: >60 ML/MIN/1.73
GLUCOSE BLD-MCNC: 131 MG/DL (ref 74–99)
GLUCOSE URINE: NEGATIVE MG/DL
GONADOTROPIN, CHORIONIC (HCG) QUANT: ABNORMAL MIU/ML
HCG, URINE, POC: POSITIVE
HCT VFR BLD CALC: 34.1 % (ref 34–48)
HEMOGLOBIN: 10.1 G/DL (ref 11.5–15.5)
IMMATURE GRANULOCYTES #: 0.03 E9/L
IMMATURE GRANULOCYTES %: 0.3 % (ref 0–5)
KETONES, URINE: 15 MG/DL
LEUKOCYTE ESTERASE, URINE: ABNORMAL
LYMPHOCYTES ABSOLUTE: 2.02 E9/L (ref 1.5–4)
LYMPHOCYTES RELATIVE PERCENT: 18.4 % (ref 20–42)
Lab: NORMAL
MCH RBC QN AUTO: 20.6 PG (ref 26–35)
MCHC RBC AUTO-ENTMCNC: 29.6 % (ref 32–34.5)
MCV RBC AUTO: 69.5 FL (ref 80–99.9)
MONOCYTES ABSOLUTE: 0.69 E9/L (ref 0.1–0.95)
MONOCYTES RELATIVE PERCENT: 6.3 % (ref 2–12)
NEGATIVE QC PASS/FAIL: NORMAL
NEUTROPHILS ABSOLUTE: 8.12 E9/L (ref 1.8–7.3)
NEUTROPHILS RELATIVE PERCENT: 74.1 % (ref 43–80)
NITRITE, URINE: NEGATIVE
PDW BLD-RTO: 18.1 FL (ref 11.5–15)
PH UA: 7 (ref 5–9)
PLATELET # BLD: 402 E9/L (ref 130–450)
PMV BLD AUTO: 11.4 FL (ref 7–12)
POSITIVE QC PASS/FAIL: NORMAL
POTASSIUM SERPL-SCNC: 3.6 MMOL/L (ref 3.5–5)
PROTEIN UA: ABNORMAL MG/DL
RBC # BLD: 4.91 E12/L (ref 3.5–5.5)
RBC UA: ABNORMAL /HPF (ref 0–2)
SODIUM BLD-SCNC: 136 MMOL/L (ref 132–146)
SPECIFIC GRAVITY UA: 1.02 (ref 1–1.03)
TOTAL PROTEIN: 6.2 G/DL (ref 6.4–8.3)
UROBILINOGEN, URINE: 1 E.U./DL
WBC # BLD: 11 E9/L (ref 4.5–11.5)
WBC UA: ABNORMAL /HPF (ref 0–5)

## 2020-10-01 PROCEDURE — 96374 THER/PROPH/DIAG INJ IV PUSH: CPT

## 2020-10-01 PROCEDURE — 99283 EMERGENCY DEPT VISIT LOW MDM: CPT

## 2020-10-01 PROCEDURE — 6360000002 HC RX W HCPCS: Performed by: PHYSICIAN ASSISTANT

## 2020-10-01 PROCEDURE — 80053 COMPREHEN METABOLIC PANEL: CPT

## 2020-10-01 PROCEDURE — 99284 EMERGENCY DEPT VISIT MOD MDM: CPT

## 2020-10-01 PROCEDURE — 85025 COMPLETE CBC W/AUTO DIFF WBC: CPT

## 2020-10-01 PROCEDURE — 84702 CHORIONIC GONADOTROPIN TEST: CPT

## 2020-10-01 PROCEDURE — 2580000003 HC RX 258: Performed by: PHYSICIAN ASSISTANT

## 2020-10-01 PROCEDURE — 81001 URINALYSIS AUTO W/SCOPE: CPT

## 2020-10-01 RX ORDER — METOCLOPRAMIDE HYDROCHLORIDE 5 MG/ML
5 INJECTION INTRAMUSCULAR; INTRAVENOUS ONCE
Status: COMPLETED | OUTPATIENT
Start: 2020-10-01 | End: 2020-10-01

## 2020-10-01 RX ORDER — 0.9 % SODIUM CHLORIDE 0.9 %
1000 INTRAVENOUS SOLUTION INTRAVENOUS ONCE
Status: COMPLETED | OUTPATIENT
Start: 2020-10-01 | End: 2020-10-01

## 2020-10-01 RX ORDER — NITROFURANTOIN 25; 75 MG/1; MG/1
100 CAPSULE ORAL 2 TIMES DAILY
Qty: 14 CAPSULE | Refills: 0 | Status: SHIPPED | OUTPATIENT
Start: 2020-10-01 | End: 2020-10-08

## 2020-10-01 RX ORDER — METOCLOPRAMIDE 10 MG/1
10 TABLET ORAL 3 TIMES DAILY PRN
Qty: 30 TABLET | Refills: 0 | Status: SHIPPED | OUTPATIENT
Start: 2020-10-01 | End: 2020-11-05

## 2020-10-01 RX ADMIN — METOCLOPRAMIDE HYDROCHLORIDE 5 MG: 5 INJECTION INTRAMUSCULAR; INTRAVENOUS at 17:17

## 2020-10-01 RX ADMIN — SODIUM CHLORIDE 1000 ML: 9 INJECTION, SOLUTION INTRAVENOUS at 17:17

## 2020-10-01 NOTE — ED NOTES
Discharge instructions given, medications and follow up instructions reviewed. Patient verbalized understanding, no other noted or stated problems at this time. Patient will follow up with physicians as directed.       Owen Pete RN  10/01/20 4752

## 2020-12-29 ENCOUNTER — HOSPITAL ENCOUNTER (OUTPATIENT)
Age: 20
Discharge: HOME | End: 2020-12-29
Attending: OBSTETRICS & GYNECOLOGY | Admitting: OBSTETRICS & GYNECOLOGY
Payer: MEDICAID

## 2020-12-29 VITALS — TEMPERATURE: 98.2 F | HEIGHT: 64 IN | WEIGHT: 213 LBS | BODY MASS INDEX: 36.37 KG/M2

## 2020-12-29 PROBLEM — O23.42 UTI (URINARY TRACT INFECTION) DURING PREGNANCY, SECOND TRIMESTER: Status: ACTIVE | Noted: 2020-12-29

## 2020-12-29 LAB
AMORPHOUS: ABNORMAL
BACTERIA: ABNORMAL /HPF
BILIRUBIN URINE: NEGATIVE
BLOOD, URINE: NEGATIVE
CLARITY: CLEAR
COLOR: YELLOW
EPITHELIAL CELLS, UA: ABNORMAL /HPF
GLUCOSE URINE: NEGATIVE MG/DL
KETONES, URINE: NEGATIVE MG/DL
LEUKOCYTE ESTERASE, URINE: ABNORMAL
NITRITE, URINE: NEGATIVE
PH UA: 7 (ref 5–9)
PROTEIN UA: NEGATIVE MG/DL
RBC UA: ABNORMAL /HPF (ref 0–2)
SPECIFIC GRAVITY UA: 1.02 (ref 1–1.03)
UROBILINOGEN, URINE: 0.2 E.U./DL
WBC UA: ABNORMAL /HPF (ref 0–5)

## 2020-12-29 PROCEDURE — 2580000003 HC RX 258

## 2020-12-29 PROCEDURE — 6360000002 HC RX W HCPCS: Performed by: NURSE PRACTITIONER

## 2020-12-29 PROCEDURE — 99211 OFF/OP EST MAY X REQ PHY/QHP: CPT

## 2020-12-29 PROCEDURE — 81001 URINALYSIS AUTO W/SCOPE: CPT

## 2020-12-29 PROCEDURE — 96372 THER/PROPH/DIAG INJ SC/IM: CPT

## 2020-12-29 PROCEDURE — 99213 OFFICE O/P EST LOW 20 MIN: CPT | Performed by: NURSE PRACTITIONER

## 2020-12-29 RX ORDER — CEFTRIAXONE 1 G/1
1 INJECTION, POWDER, FOR SOLUTION INTRAMUSCULAR; INTRAVENOUS ONCE
Status: COMPLETED | OUTPATIENT
Start: 2020-12-29 | End: 2020-12-29

## 2020-12-29 RX ADMIN — CEFTRIAXONE 1 G: 1 INJECTION, POWDER, FOR SOLUTION INTRAMUSCULAR; INTRAVENOUS at 11:55

## 2020-12-29 RX ADMIN — WATER 2.1 ML: 1 INJECTION INTRAMUSCULAR; INTRAVENOUS; SUBCUTANEOUS at 11:55

## 2020-12-29 NOTE — PROGRESS NOTES
Discharge instructions given to patient. Patient states that she verbally understands instructions. Patient educated on  labor signs and when to return to unit. Patient has no questions at this time.  Patient ambulated off floor alone    Patient directed to take antibiotic given to her before until gone

## 2020-12-29 NOTE — PROGRESS NOTES
19.2  21    Reports to unit via squad with complaints of abdominal pain that is intermittent. Patient was given keflex 2 weeks ago for UTI but states she never took it because she felt better. Patient was at office yesterday but said she didn't have any issues yesterday.   TOCO applied.

## 2020-12-29 NOTE — H&P
Department of Obstetrics and Gynecology  Labor and Delivery  Triage Note      SUBJECTIVE:  Graciela Burroughs is a 23 y.o. female, Junie Hamilton, Patient's last menstrual period was 08/16/2020., Estimated Date of Delivery: 5/23/21, 19w2d, here with the complaint of low back pain and cramping. Pt states she was dx with uti 2 weeks ago and given an antibiotic. However she never started the antibiotic because she felt better. Pt denies complications with pregnancy. Denies lof, vb or decreased fm. She is having some Urinary frequency, denies dysuria or hematuria. She was positive for chlamydia 2 weeks ago, repeat cultures done last week and she was negative.      Prenatal course: uneventful    Review of Systems:   Ears, nose, mouth, throat, and face: negative  Respiratory: negative  Cardiovascular: negative  Gastrointestinal: negative  Genitourinary:positive for frequency  Integument/breast: negative  Hematologic/lymphatic: negative  Musculoskeletal:negative  Neurological: negative  Behavioral/Psych: negative  Endocrine: negative  Allergic/Immunologic: negative    OBJECTIVE    Vitals:  Temp 98.2 °F (36.8 °C) (Oral)   Ht 5' 4\" (1.626 m)   Wt 213 lb (96.6 kg)   LMP 08/16/2020   BMI 36.56 kg/m²     General- alert, NAD  Skin- warm and dry, no rashes seen  Psych- normal mood and affect  Neuro- CN II-XII grossly intact  Abdomen: soft, nontender  Cervix deferred  Musculoskeletal-no cva tenderness    Fetal heart rate:         Spot check fetal heart tones-142   Contraction frequency: q0 minutes    ASSESSMENT:    uti    Plan: d/w Dr. Zachary Perez left, awaiting return call

## 2021-01-27 PROBLEM — O23.42 UTI (URINARY TRACT INFECTION) DURING PREGNANCY, SECOND TRIMESTER: Status: RESOLVED | Noted: 2020-12-29 | Resolved: 2021-01-27

## 2021-02-24 DIAGNOSIS — Z3A.23 23 WEEKS GESTATION OF PREGNANCY: ICD-10-CM

## 2021-02-24 DIAGNOSIS — Z34.82 MULTIGRAVIDA IN SECOND TRIMESTER: ICD-10-CM

## 2021-02-24 LAB
GLUCOSE TOLERANCE TEST 1 HOUR: 111 MG/DL
GLUCOSE TOLERANCE TEST 2 HOUR: 83 MG/DL
GLUCOSE TOLERANCE TEST FASTING: 77 MG/DL
HCT VFR BLD CALC: 27.5 % (ref 34–48)
HEMOGLOBIN: 7.4 G/DL (ref 11.5–15.5)
MCH RBC QN AUTO: 18.4 PG (ref 26–35)
MCHC RBC AUTO-ENTMCNC: 26.9 % (ref 32–34.5)
MCV RBC AUTO: 68.4 FL (ref 80–99.9)
PDW BLD-RTO: 19.6 FL (ref 11.5–15)
PLATELET # BLD: 334 E9/L (ref 130–450)
PMV BLD AUTO: 11.3 FL (ref 7–12)
RBC # BLD: 4.02 E12/L (ref 3.5–5.5)
WBC # BLD: 9.7 E9/L (ref 4.5–11.5)

## 2021-02-25 LAB
ABO/RH: NORMAL
ANTIBODY SCREEN: NORMAL

## 2021-04-21 DIAGNOSIS — Z34.83 MULTIGRAVIDA IN THIRD TRIMESTER: ICD-10-CM

## 2021-04-29 PROBLEM — O98.819 CHLAMYDIA INFECTION DURING PREGNANCY: Status: ACTIVE | Noted: 2021-04-29

## 2021-04-29 PROBLEM — O99.013 ANEMIA IN PREGNANCY, THIRD TRIMESTER: Status: ACTIVE | Noted: 2021-04-29

## 2021-04-29 PROBLEM — O99.213 OBESITY AFFECTING PREGNANCY IN THIRD TRIMESTER: Status: ACTIVE | Noted: 2021-04-29

## 2021-04-29 PROBLEM — A74.9 CHLAMYDIA INFECTION DURING PREGNANCY: Status: ACTIVE | Noted: 2021-04-29

## 2021-04-29 PROBLEM — O09.293 HISTORY OF POSTPARTUM HEMORRHAGE, CURRENTLY PREGNANT IN THIRD TRIMESTER: Status: ACTIVE | Noted: 2021-04-29

## 2021-04-29 PROBLEM — Z34.83 MULTIGRAVIDA IN THIRD TRIMESTER: Status: ACTIVE | Noted: 2021-04-29

## 2021-05-05 DIAGNOSIS — O99.013 ANEMIA IN PREGNANCY, THIRD TRIMESTER: ICD-10-CM

## 2021-05-05 LAB
HCT VFR BLD CALC: 31.6 % (ref 34–48)
HEMOGLOBIN: 8.4 G/DL (ref 11.5–15.5)
MCH RBC QN AUTO: 20.4 PG (ref 26–35)
MCHC RBC AUTO-ENTMCNC: 26.6 % (ref 32–34.5)
MCV RBC AUTO: 76.7 FL (ref 80–99.9)
PDW BLD-RTO: 27 FL (ref 11.5–15)
PLATELET # BLD: 307 E9/L (ref 130–450)
PMV BLD AUTO: 11.8 FL (ref 7–12)
RBC # BLD: 4.12 E12/L (ref 3.5–5.5)
WBC # BLD: 9.9 E9/L (ref 4.5–11.5)

## 2021-05-19 DIAGNOSIS — O99.013 ANEMIA IN PREGNANCY, THIRD TRIMESTER: ICD-10-CM

## 2021-05-19 LAB
HCT VFR BLD CALC: 30.6 % (ref 34–48)
HEMOGLOBIN: 8.4 G/DL (ref 11.5–15.5)
MCH RBC QN AUTO: 19.9 PG (ref 26–35)
MCHC RBC AUTO-ENTMCNC: 27.5 % (ref 32–34.5)
MCV RBC AUTO: 72.3 FL (ref 80–99.9)
PDW BLD-RTO: 23.5 FL (ref 11.5–15)
PLATELET # BLD: 358 E9/L (ref 130–450)
PMV BLD AUTO: 11.3 FL (ref 7–12)
RBC # BLD: 4.23 E12/L (ref 3.5–5.5)
WBC # BLD: 11.7 E9/L (ref 4.5–11.5)

## 2021-05-25 ENCOUNTER — HOSPITAL ENCOUNTER (INPATIENT)
Age: 21
LOS: 2 days | Discharge: HOME | DRG: 560 | End: 2021-05-27
Attending: OBSTETRICS & GYNECOLOGY | Admitting: OBSTETRICS & GYNECOLOGY
Payer: MEDICAID

## 2021-05-25 ENCOUNTER — ANESTHESIA (OUTPATIENT)
Dept: LABOR AND DELIVERY | Age: 21
DRG: 560 | End: 2021-05-25
Payer: MEDICAID

## 2021-05-25 ENCOUNTER — ANESTHESIA EVENT (OUTPATIENT)
Dept: LABOR AND DELIVERY | Age: 21
DRG: 560 | End: 2021-05-25
Payer: MEDICAID

## 2021-05-25 ENCOUNTER — APPOINTMENT (OUTPATIENT)
Dept: LABOR AND DELIVERY | Age: 21
DRG: 560 | End: 2021-05-25
Payer: MEDICAID

## 2021-05-25 PROBLEM — Z3A.40 40 WEEKS GESTATION OF PREGNANCY: Status: ACTIVE | Noted: 2021-05-25

## 2021-05-25 LAB
ABO/RH: NORMAL
AMPHETAMINE SCREEN, URINE: NOT DETECTED
ANTIBODY IDENTIFICATION: NORMAL
ANTIBODY SCREEN: NORMAL
BARBITURATE SCREEN URINE: NOT DETECTED
BENZODIAZEPINE SCREEN, URINE: NOT DETECTED
CANNABINOID SCREEN URINE: NOT DETECTED
COCAINE METABOLITE SCREEN URINE: NOT DETECTED
DAT POLYSPECIFIC: NORMAL
DR. NOTIFY: NORMAL
FENTANYL SCREEN, URINE: NOT DETECTED
HCT VFR BLD CALC: 28.9 % (ref 34–48)
HEMOGLOBIN: 8.1 G/DL (ref 11.5–15.5)
Lab: NORMAL
MCH RBC QN AUTO: 19.8 PG (ref 26–35)
MCHC RBC AUTO-ENTMCNC: 28 % (ref 32–34.5)
MCV RBC AUTO: 70.5 FL (ref 80–99.9)
METHADONE SCREEN, URINE: NOT DETECTED
OPIATE SCREEN URINE: NOT DETECTED
OXYCODONE URINE: NOT DETECTED
PDW BLD-RTO: 22.4 FL (ref 11.5–15)
PHENCYCLIDINE SCREEN URINE: NOT DETECTED
PLATELET # BLD: 327 E9/L (ref 130–450)
PMV BLD AUTO: 11.1 FL (ref 7–12)
RBC # BLD: 4.1 E12/L (ref 3.5–5.5)
WBC # BLD: 8.5 E9/L (ref 4.5–11.5)

## 2021-05-25 PROCEDURE — 85027 COMPLETE CBC AUTOMATED: CPT

## 2021-05-25 PROCEDURE — 6370000000 HC RX 637 (ALT 250 FOR IP): Performed by: OBSTETRICS & GYNECOLOGY

## 2021-05-25 PROCEDURE — 86900 BLOOD TYPING SEROLOGIC ABO: CPT

## 2021-05-25 PROCEDURE — 2500000003 HC RX 250 WO HCPCS: Performed by: ANESTHESIOLOGY

## 2021-05-25 PROCEDURE — 2580000003 HC RX 258: Performed by: OBSTETRICS & GYNECOLOGY

## 2021-05-25 PROCEDURE — 86850 RBC ANTIBODY SCREEN: CPT

## 2021-05-25 PROCEDURE — 3700000025 EPIDURAL BLOCK: Performed by: ANESTHESIOLOGY

## 2021-05-25 PROCEDURE — 36415 COLL VENOUS BLD VENIPUNCTURE: CPT

## 2021-05-25 PROCEDURE — 86880 COOMBS TEST DIRECT: CPT

## 2021-05-25 PROCEDURE — 6360000002 HC RX W HCPCS: Performed by: OBSTETRICS & GYNECOLOGY

## 2021-05-25 PROCEDURE — 86901 BLOOD TYPING SEROLOGIC RH(D): CPT

## 2021-05-25 PROCEDURE — 6360000002 HC RX W HCPCS: Performed by: ANESTHESIOLOGY

## 2021-05-25 PROCEDURE — 80307 DRUG TEST PRSMV CHEM ANLYZR: CPT

## 2021-05-25 PROCEDURE — 51701 INSERT BLADDER CATHETER: CPT

## 2021-05-25 PROCEDURE — 6370000000 HC RX 637 (ALT 250 FOR IP)

## 2021-05-25 PROCEDURE — 1220000001 HC SEMI PRIVATE L&D R&B

## 2021-05-25 PROCEDURE — 86870 RBC ANTIBODY IDENTIFICATION: CPT

## 2021-05-25 RX ORDER — NALBUPHINE HCL 10 MG/ML
5 AMPUL (ML) INJECTION EVERY 4 HOURS PRN
Status: DISCONTINUED | OUTPATIENT
Start: 2021-05-25 | End: 2021-05-26 | Stop reason: HOSPADM

## 2021-05-25 RX ORDER — ACETAMINOPHEN 650 MG
TABLET, EXTENDED RELEASE ORAL
Status: COMPLETED
Start: 2021-05-25 | End: 2021-05-26

## 2021-05-25 RX ORDER — NALOXONE HYDROCHLORIDE 0.4 MG/ML
0.4 INJECTION, SOLUTION INTRAMUSCULAR; INTRAVENOUS; SUBCUTANEOUS PRN
Status: DISCONTINUED | OUTPATIENT
Start: 2021-05-25 | End: 2021-05-26 | Stop reason: HOSPADM

## 2021-05-25 RX ORDER — SODIUM CHLORIDE, SODIUM LACTATE, POTASSIUM CHLORIDE, CALCIUM CHLORIDE 600; 310; 30; 20 MG/100ML; MG/100ML; MG/100ML; MG/100ML
INJECTION, SOLUTION INTRAVENOUS CONTINUOUS
Status: DISCONTINUED | OUTPATIENT
Start: 2021-05-25 | End: 2021-05-27 | Stop reason: HOSPADM

## 2021-05-25 RX ORDER — ONDANSETRON 2 MG/ML
4 INJECTION INTRAMUSCULAR; INTRAVENOUS EVERY 6 HOURS PRN
Status: DISCONTINUED | OUTPATIENT
Start: 2021-05-25 | End: 2021-05-26 | Stop reason: HOSPADM

## 2021-05-25 RX ORDER — ONDANSETRON 2 MG/ML
4 INJECTION INTRAMUSCULAR; INTRAVENOUS EVERY 6 HOURS PRN
Status: DISCONTINUED | OUTPATIENT
Start: 2021-05-25 | End: 2021-05-26

## 2021-05-25 RX ADMIN — Medication 25 MCG: at 16:57

## 2021-05-25 RX ADMIN — Medication 25 MCG: at 12:57

## 2021-05-25 RX ADMIN — BUTORPHANOL TARTRATE 2 MG: 2 INJECTION, SOLUTION INTRAMUSCULAR; INTRAVENOUS at 17:09

## 2021-05-25 RX ADMIN — SODIUM CHLORIDE, POTASSIUM CHLORIDE, SODIUM LACTATE AND CALCIUM CHLORIDE: 600; 310; 30; 20 INJECTION, SOLUTION INTRAVENOUS at 19:44

## 2021-05-25 RX ADMIN — ONDANSETRON 4 MG: 2 INJECTION INTRAMUSCULAR; INTRAVENOUS at 21:32

## 2021-05-25 RX ADMIN — Medication 15 ML/HR: at 21:20

## 2021-05-25 RX ADMIN — SODIUM CHLORIDE, POTASSIUM CHLORIDE, SODIUM LACTATE AND CALCIUM CHLORIDE: 600; 310; 30; 20 INJECTION, SOLUTION INTRAVENOUS at 12:30

## 2021-05-25 RX ADMIN — SODIUM CHLORIDE, POTASSIUM CHLORIDE, SODIUM LACTATE AND CALCIUM CHLORIDE: 600; 310; 30; 20 INJECTION, SOLUTION INTRAVENOUS at 16:44

## 2021-05-25 RX ADMIN — Medication 1 MILLI-UNITS/MIN: at 22:12

## 2021-05-25 ASSESSMENT — PAIN SCALES - GENERAL: PAINLEVEL_OUTOF10: 6

## 2021-05-25 NOTE — PROGRESS NOTES
. 40.2 weeks gestation. Presents to unit for schedules iol for post dates. Denies lof, vb, ctxs. States positive fm. Placed on efm.  States she has hx of pph with blood transfusion

## 2021-05-25 NOTE — H&P
Department of Obstetrics and Gynecology  Labor and Delivery  History & Physical    Patient:  Jhonathan Hansen Date:  2021 10:21 AM  Medical Record Number:  [id number]    CHIEF COMPLAINT: 40 weeks 2 days for postdates induction of labor with Cytotec (BS = 4)    PROBLEM LIST:     Patient Active Problem List   Diagnosis    Multigravida in third trimester    Anemia in pregnancy, third trimester- HGB 7.7 at 29 weeks, 8.1 at 34 weeks. Had iron infusion x2 with hematology - [facility] (last infusion 21). Recheck weekly CBC.  History of postpartum hemorrhage (del note says 350 ml), currently pregnant in third trimester-required blood tranfusion (2 units PPD#1)    Obesity affecting pregnancy in third trimester-pregravid BMI was 37    Chlamydia infection during pregnancy-treated wtih 1000 mg zithromax 21- JOSE ALFREDO 21 negative.  40 2/7 weeks gestation of pregnancy    Post-term pregnancy, 40-42 weeks of gestation           HISTORY OF PRESENT ILLNESS:    The patient is a 23 y.o. H female  at 41w4d. Patient presents with a Sparrow score = 4 for Cytotec cervical ripening/labor induction. She denies contractions, leaking fluid and vaginal bleeding. She has no symptoms of UTI or PIH. There is good fetal movement. Patient's pregnancy has been complicated by anemia in pregnancy requiring iron infusions at the [facility]. She has a history of postpartum hemorrhage and blood transfusion (2 units) with her last pregnancy. GBS testing and Covid screen are both negative.     ESTIMATED DUE DATE: Estimated Date of Delivery: 21    PRENATAL CARE:  Complicated by: Anemia requiring iron infusions at the [facility]  GBS: negative    Past OB History  OB History        2    Para   1    Term   1            AB        Living   1       SAB        TAB        Ectopic        Molar        Multiple   0    Live Births   1                Past Medical History:        Diagnosis Date    Anemia affecting pregnancy in third trimester 10/11/2016    Anxiety     Asthma     Depression with suicidal ideation 11/15/2019    History of blood transfusion     x3 in 2016 after birth of son       Past Surgical History:    History reviewed. No pertinent surgical history. Allergies:  Patient has no known allergies. Social History:    Social History     Socioeconomic History    Marital status: Single     Spouse name: Not on file    Number of children: Not on file    Years of education: Not on file    Highest education level: Not on file   Occupational History    Not on file   Tobacco Use    Smoking status: Former Smoker    Smokeless tobacco: Never Used   Substance and Sexual Activity    Alcohol use: Not Currently    Drug use: Not Currently    Sexual activity: Yes     Partners: Male   Other Topics Concern    Not on file   Social History Narrative    Not on file     Social Determinants of Health     Financial Resource Strain:     Difficulty of Paying Living Expenses:    Food Insecurity:     Worried About Running Out of Food in the Last Year:     [address] in the Last Year:    Transportation Needs:     Lack of Transportation (Medical):  Lack of Transportation (Non-Medical):    Physical Activity:     Days of Exercise per Week:     Minutes of Exercise per Session:    Stress:     Feeling of Stress :    Social Connections:     Frequency of Communication with Friends and Family:     Frequency of Social Gatherings with Friends and Family:     Attends Rastafarian Services:     Active Member of Clubs or Organizations:     Attends Club or Organization Meetings:     Marital Status:    Intimate Partner Violence:     Fear of Current or Ex-Partner:     Emotionally Abused:     Physically Abused:     Sexually Abused:        Family History:   History reviewed. No pertinent family history.     Medications Prior to Admission:  Medications Prior to Admission: Prenatal Vit-Fe Fumarate-FA (PREPLUS) 27-1 MG TABS,   clindamycin (CLEOCIN) 300 MG capsule, Take 1 capsule by mouth 2 times daily for 7 days  famotidine (PEPCID) 20 MG tablet, Take 1 tablet by mouth daily  ferrous sulfate (IRON 325) 325 (65 Fe) MG tablet, Take 1 tablet by mouth 2 times daily    REVIEW OF SYSTEMS:  CONSTITUTIONAL:  negative  RESPIRATORY:  negative  CARDIOVASCULAR:  negative  GASTROINTESTINAL:  negative  ALLERGIC/IMMUNOLOGIC:  negative  NEUROLOGICAL:  negative  BEHAVIOR/PSYCH:  negative    PHYSICAL EXAM:  Vitals:    05/25/21 1127 05/25/21 1139   BP:  121/71   Pulse:  102   Resp:  15   Temp:  98.4 °F (36.9 °C)   TempSrc:  Oral   Weight: 220 lb (99.8 kg)    Height: 5' 4\" (1.626 m)      General appearance:  awake, alert, cooperative, no apparent distress, and appears stated age  Neurologic:  Awake, alert, oriented to name, place and time. Skin: warm, dry, normal color, no rashes  Head:  NC,AT,NT  Eyes:  Sclerae white, pupils equal and reactive, red reflex normal bilaterally  Ears:  Well-positioned, well-formed pinnae; Hearing: intact  Nose:  Clear, normal mucosa  Throat:  Lips, tongue and mucosa are pink, moist and intact; no exudate  Neck:  Supple, symmetrical  Heart:  Regular rate & rhythm, S1 S2, no murmurs, rubs, or gallops  Lungs:  No increased work of breathing, good air exchange, CTA b/l. Abdomen:  Soft, non tender, gravid, consistent with her gestational age, EFW by Leopald's manouever was 7.5#  Extremities: No clubbing, cyanosis, cords; No calf tenderness; Edema: no  Pulses:  Strong equal distal pulses, brisk capillary refill  Fetal heart rate:  Reassuring. Pelvis:  Adequate pelvis  Cervix: Closed / 50% / soft / -2 / posterior, Sparrow Score: 4  Contraction frequency: Occasional, irregular not felt by patient  Membranes:  Intact    Labs:  No results found for this or any previous visit (from the past 72 hour(s)).     ASSESSMENT:  23 y.o. H female at [address] [place] [address] labor

## 2021-05-26 PROBLEM — F32.A DEPRESSION WITH SUICIDAL IDEATION: Status: RESOLVED | Noted: 2019-11-15 | Resolved: 2021-05-26

## 2021-05-26 PROBLEM — R45.851 DEPRESSION WITH SUICIDAL IDEATION: Status: RESOLVED | Noted: 2019-11-15 | Resolved: 2021-05-26

## 2021-05-26 PROBLEM — O48.0 POST-TERM PREGNANCY, 40-42 WEEKS OF GESTATION: Status: ACTIVE | Noted: 2021-05-26

## 2021-05-26 PROBLEM — F33.3 SEVERE EPISODE OF RECURRENT MAJOR DEPRESSIVE DISORDER, WITH PSYCHOTIC FEATURES (HCC): Status: RESOLVED | Noted: 2019-11-16 | Resolved: 2021-05-26

## 2021-05-26 PROCEDURE — 6370000000 HC RX 637 (ALT 250 FOR IP): Performed by: OBSTETRICS & GYNECOLOGY

## 2021-05-26 PROCEDURE — 6370000000 HC RX 637 (ALT 250 FOR IP)

## 2021-05-26 PROCEDURE — 2580000003 HC RX 258

## 2021-05-26 PROCEDURE — 7200000001 HC VAGINAL DELIVERY

## 2021-05-26 PROCEDURE — 51701 INSERT BLADDER CATHETER: CPT

## 2021-05-26 PROCEDURE — 1220000000 HC SEMI PRIVATE OB R&B

## 2021-05-26 RX ORDER — SODIUM CHLORIDE 9 MG/ML
INJECTION INTRAVENOUS
Status: COMPLETED
Start: 2021-05-26 | End: 2021-05-26

## 2021-05-26 RX ORDER — DOCUSATE SODIUM 100 MG/1
100 CAPSULE, LIQUID FILLED ORAL 2 TIMES DAILY
Status: DISCONTINUED | OUTPATIENT
Start: 2021-05-26 | End: 2021-05-27 | Stop reason: HOSPADM

## 2021-05-26 RX ORDER — IBUPROFEN 600 MG/1
600 TABLET ORAL EVERY 6 HOURS PRN
Status: DISCONTINUED | OUTPATIENT
Start: 2021-05-27 | End: 2021-05-26

## 2021-05-26 RX ORDER — MODIFIED LANOLIN
OINTMENT (GRAM) TOPICAL PRN
Status: DISCONTINUED | OUTPATIENT
Start: 2021-05-26 | End: 2021-05-27 | Stop reason: HOSPADM

## 2021-05-26 RX ORDER — IBUPROFEN 600 MG/1
600 TABLET ORAL EVERY 6 HOURS PRN
Status: DISCONTINUED | OUTPATIENT
Start: 2021-05-26 | End: 2021-05-27 | Stop reason: HOSPADM

## 2021-05-26 RX ORDER — FERROUS SULFATE 325(65) MG
325 TABLET ORAL 2 TIMES DAILY
Status: DISCONTINUED | OUTPATIENT
Start: 2021-05-26 | End: 2021-05-27 | Stop reason: HOSPADM

## 2021-05-26 RX ORDER — PRENATAL WITH FERROUS FUM AND FOLIC ACID 3080; 920; 120; 400; 22; 1.84; 3; 20; 10; 1; 12; 200; 27; 25; 2 [IU]/1; [IU]/1; MG/1; [IU]/1; MG/1; MG/1; MG/1; MG/1; MG/1; MG/1; UG/1; MG/1; MG/1; MG/1; MG/1
1 TABLET ORAL
Status: DISCONTINUED | OUTPATIENT
Start: 2021-05-26 | End: 2021-05-27 | Stop reason: HOSPADM

## 2021-05-26 RX ORDER — IBUPROFEN 600 MG/1
TABLET ORAL
Status: COMPLETED
Start: 2021-05-26 | End: 2021-05-26

## 2021-05-26 RX ORDER — ACETAMINOPHEN 325 MG/1
650 TABLET ORAL EVERY 4 HOURS PRN
Status: DISCONTINUED | OUTPATIENT
Start: 2021-05-26 | End: 2021-05-27 | Stop reason: HOSPADM

## 2021-05-26 RX ORDER — SODIUM CHLORIDE 0.9 % (FLUSH) 0.9 %
SYRINGE (ML) INJECTION
Status: DISPENSED
Start: 2021-05-26 | End: 2021-05-27

## 2021-05-26 RX ADMIN — ACETAMINOPHEN 650 MG: 325 TABLET ORAL at 08:30

## 2021-05-26 RX ADMIN — IBUPROFEN 600 MG: 600 TABLET, FILM COATED ORAL at 12:52

## 2021-05-26 RX ADMIN — IBUPROFEN 600 MG: 600 TABLET ORAL at 04:23

## 2021-05-26 RX ADMIN — Medication: at 08:29

## 2021-05-26 RX ADMIN — IBUPROFEN 600 MG: 600 TABLET, FILM COATED ORAL at 20:52

## 2021-05-26 RX ADMIN — DOCUSATE SODIUM 100 MG: 100 CAPSULE, LIQUID FILLED ORAL at 08:31

## 2021-05-26 RX ADMIN — DOCUSATE SODIUM 100 MG: 100 CAPSULE, LIQUID FILLED ORAL at 20:52

## 2021-05-26 RX ADMIN — SODIUM CHLORIDE, PRESERVATIVE FREE 10 ML: 5 INJECTION INTRAVENOUS at 20:54

## 2021-05-26 RX ADMIN — Medication 166.7 ML: at 02:12

## 2021-05-26 RX ADMIN — FERROUS SULFATE TAB 325 MG (65 MG ELEMENTAL FE) 325 MG: 325 (65 FE) TAB at 16:53

## 2021-05-26 RX ADMIN — FERROUS SULFATE TAB 325 MG (65 MG ELEMENTAL FE) 325 MG: 325 (65 FE) TAB at 08:28

## 2021-05-26 RX ADMIN — Medication: at 02:00

## 2021-05-26 RX ADMIN — METFORMIN HYDROCHLORIDE 1 TABLET: 500 TABLET, EXTENDED RELEASE ORAL at 08:28

## 2021-05-26 ASSESSMENT — PAIN SCALES - GENERAL
PAINLEVEL_OUTOF10: 3
PAINLEVEL_OUTOF10: 7

## 2021-05-26 NOTE — ANESTHESIA PROCEDURE NOTES
Epidural Block    Patient location during procedure: OB  Start time: 5/25/2021 8:21 PM  End time: 5/25/2021 8:33 PM  Reason for block: labor epidural  Staffing  Performed: other anesthesia staff   Anesthesiologist: Arleen Johnson MD  Resident/CRNA: Ledon Gottron, APRN - YARITZA  Other anesthesia staff: Axel Mccall RN  Preanesthetic Checklist  Completed: patient identified, IV checked, site marked, risks and benefits discussed, surgical consent, monitors and equipment checked, pre-op evaluation, timeout performed, anesthesia consent given, oxygen available and patient being monitored  Epidural  Patient position: sitting  Prep: Betadine  Patient monitoring: continuous pulse ox and frequent blood pressure checks  Approach: midline  Location: lumbar (1-5)  Injection technique: ADAMS air  Provider prep: mask and sterile gloves  Needle  Needle type: Tuohy   Needle gauge: 18 G  Needle length: 3.5 in  Needle insertion depth: 8 cm  Catheter type: end hole  Catheter size: 20 G. Catheter at skin depth: 15 cm  Test dose: positive  Assessment  Hemodynamics: stable  Attempts: 1  Additional Notes  Administered test dose and noted increase in heart rate w/o contraction. No other symptoms noted. Administered a second test with similar results. Contacted [facility] and determined best to remove catheter and attempt at another level.

## 2021-05-26 NOTE — ANESTHESIA POSTPROCEDURE EVALUATION
Department of Anesthesiology  Postprocedure Note    Patient: Hilton Cortez  MRN: [id number]  YOB: 2000  Date of evaluation: 5/26/2021  Time:  9:53 AM     Procedure Summary     Date: 05/25/21 Room / Location:     Anesthesia Start: 2019 Anesthesia Stop: 05/26/21 0207    Procedure: Labor Analgesia Diagnosis:     Scheduled Providers:  Responsible Provider: Nella Isidro MD    Anesthesia Type: epidural ASA Status: 3          Anesthesia Type: epidural    Cayden Phase I:      Cayden Phase II:      Last vitals: Reviewed and per EMR flowsheets.        Anesthesia Post Evaluation    Patient location during evaluation: bedside  Patient participation: complete - patient participated  Level of consciousness: awake and alert  Pain score: 0  Airway patency: patent  Nausea & Vomiting: no nausea and no vomiting  Complications: no  Cardiovascular status: blood pressure returned to baseline and hemodynamically stable  Respiratory status: acceptable, room air, spontaneous ventilation and nonlabored ventilation  Hydration status: euvolemic

## 2021-05-26 NOTE — LACTATION NOTE
Spoke with mom, she decided to just formula feed the baby at this time. Encouraged mom to call if she changes her mind. States understanding.

## 2021-05-26 NOTE — PROGRESS NOTES
Called Dr Salvador Butterfield for update on sve, ordered to begin pushing if patient is feeling pressure and to update.

## 2021-05-26 NOTE — PROGRESS NOTES
Admitted to room 309 from antepartum via wheelchair with infant and accompanied by RN from antepartum. Oriented to call light at bedside, RN cell number, Doctor's orders, need to call for help with first out of bed to void, infant safety and security, pamphlets at bedside, visitation policy of one overnight visitor over the age of 25, and ordering meals.

## 2021-05-26 NOTE — ANESTHESIA PRE PROCEDURE
Department of Anesthesiology  Preprocedure Note       Name:  Antonia Gonzalez   Age:  23 y.o.  :  2000                                          MRN:  [id number]         Date:  2021      Surgeon: * No surgeons listed *    Procedure: * No procedures listed *    Medications prior to admission:   Prior to Admission medications    Medication Sig Start Date End Date Taking?  Authorizing Provider   Prenatal Vit-Fe Fumarate-FA (PREPLUS) 27-1 MG TABS  10/9/20  Yes Historical Provider, MD   clindamycin (CLEOCIN) 300 MG capsule Take 1 capsule by mouth 2 times daily for 7 days 21  JAMEEL Frost CNP   famotidine (PEPCID) 20 MG tablet Take 1 tablet by mouth daily 3/10/21   Adelita Casarez PA-C   ferrous sulfate (IRON 325) 325 (65 Fe) MG tablet Take 1 tablet by mouth 2 times daily 21   Adelita Casarez PA-C       Current medications:    Current Facility-Administered Medications   Medication Dose Route Frequency Provider Last Rate Last Admin    lactated ringers infusion   Intravenous Continuous Faith Matias  mL/hr at 21 194 New Bag at 21    oxytocin (PITOCIN) 10 unit bolus from the bag  10 Units Intravenous PRN Faith Matias MD        And    oxytocin (PITOCIN) 30 units in 500 mL infusion  87.3 ifeoma-units/min Intravenous Continuous PRN Faith Matias MD        ondansetron [facility]) injection 4 mg  4 mg Intravenous Q6H PRN Faith Matias MD        miSOPROStol (CYTOTEC) pre-split tablet TABS 25 mcg  25 mcg Vaginal Q4H Faith Matias MD   25 mcg at 21 1657    butorphanol (STADOL) injection 2 mg  2 mg Intravenous Q3H PRN Faith Matias MD   2 mg at 21 1709    povidone-iodine (BETADINE) 10 % external solution             naloxone (NARCAN) injection 0.4 mg  0.4 mg Intravenous PRN Niko Ravi MD        nalbuphine (NUBAIN) injection 5 mg  5 mg Intravenous Q4H PRN Niko Ravi MD        ondansetron [facility]) injection 4 mg  4 mg Intravenous Q6H PRN Papa Finley MD        fentaNYL 1.85mcg/ml and Bupivicaine 0.1% in 0.9% NS 135ml infusion (OB) epidural  15 mL/hr Epidural Continuous Bijo Liz Melo MD           Allergies:  No Known Allergies    Problem List:    Patient Active Problem List   Diagnosis Code    Depression with suicidal ideation F32.9, R45.851    Severe episode of recurrent major depressive disorder, with psychotic features ([facility] [address] F33.3    Multigravida in third trimester Z34.83    Anemia in pregnancy, third trimester- HGB 7.7 at 29 weeks, 8.1 at 34 weeks. Had iron infusion x2 with hematology - [facility] (last infusion 4/22/21). Recheck weekly CBC. O99.013    History of postpartum hemorrhage (del note says 350 ml), currently pregnant in third trimester-required blood tranfusion (2 units PPD#1) O09.293    Obesity affecting pregnancy in third trimester-pregravid BMI was 37 O99.213    Chlamydia infection during pregnancy-treated wtih 1000 mg zithromax 4/23/21- JOSE ALFREDO needed. O98.819, A74.9    40 weeks gestation of pregnancy Z3A.40       Past Medical History:        Diagnosis Date    Anemia affecting pregnancy in third trimester 10/11/2016    Anxiety     Asthma     Depression with suicidal ideation 11/15/2019    History of blood transfusion     x3 in 2016 after birth of son       Past Surgical History:  History reviewed. No pertinent surgical history.     Social History:    Social History     Tobacco Use    Smoking status: Former Smoker    Smokeless tobacco: Never Used   Substance Use Topics    Alcohol use: Not Currently                                Counseling given: Not Answered      Vital Signs (Current):   Vitals:    05/25/21 1127 05/25/21 1139 05/25/21 1919 05/25/21 1950   BP:  121/71 (!) 142/74 (!) 121/56   Pulse:  102 94 96   Resp:  15 16    Temp:  36.9 °C (98.4 °F) 36.8 °C (98.2 °F)    TempSrc:  Oral Oral    Weight: 220 lb (99.8 kg)      Height: 5' 4\" (1.626 m) BP Readings from Last 3 Encounters:   05/25/21 (!) 121/56   05/19/21 118/72   05/12/21 120/76       NPO Status: Time of last liquid consumption: 0900                        Time of last solid consumption: 0900                        Date of last liquid consumption: 05/25/21                        Date of last solid food consumption: 05/25/21    BMI:   Wt Readings from Last 3 Encounters:   05/25/21 220 lb (99.8 kg)   05/19/21 224 lb 3.2 oz (101.7 kg)   05/12/21 223 lb 3.2 oz (101.2 kg)     Body mass index is 37.76 kg/m². CBC:   Lab Results   Component Value Date    WBC 8.5 05/25/2021    RBC 4.10 05/25/2021    HGB 8.1 05/25/2021    HCT 28.9 05/25/2021    MCV 70.5 05/25/2021    RDW 22.4 05/25/2021     05/25/2021       CMP:   Lab Results   Component Value Date     10/01/2020    K 3.6 10/01/2020    K 3.9 11/15/2019     10/01/2020    CO2 21 10/01/2020    BUN 6 10/01/2020    CREATININE 0.5 10/01/2020    GFRAA >60 10/01/2020    LABGLOM >60 10/01/2020    GLUCOSE 131 10/01/2020    PROT 6.2 10/01/2020    CALCIUM 8.6 10/01/2020    BILITOT 0.4 10/01/2020    ALKPHOS 52 10/01/2020    AST 12 10/01/2020    ALT 9 10/01/2020       POC Tests: No results for input(s): POCGLU, POCNA, POCK, POCCL, POCBUN, POCHEMO, POCHCT in the last 72 hours.     Coags: No results found for: PROTIME, INR, APTT    HCG (If Applicable):   Lab Results   Component Value Date    PREGTESTUR NEGATIVE 11/15/2019        ABGs: No results found for: PHART, PO2ART, UUP1UAZ, GQH4TVV, BEART, I7KSILID     Type & Screen (If Applicable):  No results found for: LABABO, LABRH    Drug/Infectious Status (If Applicable):  No results found for: HIV, HEPCAB    COVID-19 Screening (If Applicable): No results found for: COVID19        Anesthesia Evaluation  Patient summary reviewed  Airway: Mallampati: IV  TM distance: >3 FB   Neck ROM: full  Mouth opening: > = 3 FB Dental: normal exam         Pulmonary: breath sounds clear to auscultation  (+) asthma:                            Cardiovascular:Negative CV ROS  Exercise tolerance: good (>4 METS),           Rhythm: regular  Rate: normal           Beta Blocker:  Not on Beta Blocker         Neuro/Psych:   (+) psychiatric history (Depression with suicidal ideation):depression/anxiety             GI/Hepatic/Renal: Neg GI/Hepatic/Renal ROS            Endo/Other:    (+) blood dyscrasia: anemia:., .                 Abdominal:   (+) obese,         Vascular:                                        Anesthesia Plan      epidural     ASA 3             Anesthetic plan and risks discussed with patient. Plan discussed with CRNA and attending.                   Gui Alcocer RN   5/25/2021

## 2021-05-26 NOTE — PROGRESS NOTES
Patient ambulated to bathroom with assistance. Stephanie care completed. Pt voided. Pt tolerated well.

## 2021-05-26 NOTE — PROGRESS NOTES
of viable baby girl at 46. Delayed cord clamping completed, skin to skin initiated immediately after delivery. Baby pink and warm. apgars 9/9.

## 2021-05-26 NOTE — ANESTHESIA PROCEDURE NOTES
Epidural Block    Patient location during procedure: OB  Start time: 5/25/2021 8:52 PM  End time: 5/25/2021 9:20 PM  Reason for block: labor epidural  Staffing  Performed: resident/CRNA   Anesthesiologist: Maren Riddle MD  Resident/CRNA: JAMEEL Cee - YARITZA  Other anesthesia staff: Valdez Neff RN  Preanesthetic Checklist  Completed: patient identified, IV checked, site marked, risks and benefits discussed, surgical consent, monitors and equipment checked, pre-op evaluation, timeout performed, anesthesia consent given, oxygen available and patient being monitored  Epidural  Patient position: sitting  Prep: Betadine  Patient monitoring: continuous pulse ox and frequent blood pressure checks  Approach: midline  Location: lumbar (1-5)  Injection technique: ADAMS air  Provider prep: mask and sterile gloves  Needle  Needle type: Tuohy   Needle gauge: 18 G  Needle length: 3.5 in  Needle insertion depth: 6.5 cm  Catheter type: end hole  Catheter size: 20 G. Catheter at skin depth: 13.5 cm  Test dose: negative  Assessment  Hemodynamics: stable  Attempts: 1  Additional Notes  Second epidural placed. Good loss of resistance. No heme detected in or around catheter. However, upon administering test dose, a modest increase in heart rate noted (115-30). Pt denies any ringing any other symptoms. MDA in room at time of test dose. Discussed same and will proceed with continuous infusion. Nurse instructed to monitor closely.

## 2021-05-26 NOTE — PROGRESS NOTES
Dr. Tam Rodriguez notified of sve, contraction pattern, comfortable with epidural. Order for pitocin.

## 2021-05-26 NOTE — L&D DELIVERY NOTE
Department of Obstetrics and Gynecology  Spontaneous Vaginal Delivery Note    Patient:  Abram Reece Date:  2021 10:21 AM  Medical Record Number:  [id number]   Procedure Date: 2021 2:52 AM     Pre-delivery Diagnosis: Multigravid postdates IUP at 40 weeks 3 days, iron deficiency anemia in pregnancy requiring intravenous iron infusions, history of postpartum hemorrhage and prior pregnancy requiring blood transfusion, obesity in pregnancy, clear infection during pregnancy (treated with negative test of cure)  Patient Active Problem List   Diagnosis    Multigravida in third trimester    Anemia in pregnancy, third trimester- HGB 7.7 at 29 weeks, 8.1 at 34 weeks. Had iron infusion [facility] (last infusion 21). Recheck weekly CBC.  History of postpartum hemorrhage (del note says 350 ml), currently pregnant in third trimester-required blood tranfusion (2 units PPD#1)    Obesity affecting pregnancy in third trimester-pregravid BMI was 37    Chlamydia infection during pregnancy-treated wtih 1000 mg zithromax 21- JOSE ALFREDO 21 negative.  40 3/7 weeks gestation of pregnancy    Post-term pregnancy, 40-42 weeks of gestation     Post-delivery Diagnosis:  Living  infant Female, second-degree perineal laceration, right labial laceration  Patient Active Problem List   Diagnosis    Multigravida in third trimester    Anemia in pregnancy, third trimester- HGB 7.7 at 29 weeks, 8.1 at 34 weeks. Had iron infusion x2 [facility] (last infusion 21). Recheck weekly CBC.  History of postpartum hemorrhage (del note says 350 ml), currently pregnant in third trimester-required blood tranfusion (2 units PPD#1)    Obesity affecting pregnancy in third trimester-pregravid BMI was 37    Chlamydia infection during pregnancy-treated wtih 1000 mg zithromax 21- JOSE ALFREDO 21 negative.      40 3/7 weeks gestation of pregnancy    2.    Delivery Summary:  Mother's Information    Labor Events     labor?: No  Rupture type: Artificial=AROM, Intact  Fluid color: Clear     Mother Delivery Information    Episiotomy: None  Lacerations: 2nd  # of Repair Packets: 2  Vaginal Delivery Est. Blood Loss (mL): 300  Surgical or Additional Est. Blood Loss (mL): 0 (View Only): Edit in Flowsheets   Combined Est. Blood Loss (mL): 300     Yesi, Baby Girl Georgina [[id number]]    Labor Events     labor?: No   steroids?: None  Cervical ripening date/time: 21 12:57:00   Cervical ripening type: Misoprostol  Antibiotics received during labor?: No  Rupture Identifier: Sac 1   Rupture date/time: 21 19:15:00   Rupture type: Artificial=AROM  Fluid color: Clear  Induction: Misoprostol, AROM  Indications for induction: Post-term Gestation  Augmentation: AROM  Indications for augmentation: Ineffective Contraction Pattern  Labor complications: None       Labor Event Times    Labor onset date/time: 21   Dilation complete date/time:  21 0146 EDT   Start pushin2021 0200      Anesthesia    Method: Epidural     Assisted Delivery Details    Forceps attempted?: No  Vacuum extractor attempted?: No     Shoulder Dystocia    Shoulder dystocia present?: No     [place] Presentation    Presentation: Vertex  Position: Left  _: Occiput  _: Anterior     [place] Information    Head delivery date/time: 2021 02:07:00   Changing the 's delivery date/time could affect patient care.:    Delivery date/time:  21 0207   Delivery type: Vaginal, Spontaneous     Delivery Providers    Delivering clinician: Johnny Conroy MD   Provider Role    Willie Tavera RN Registered Nurse    Yaniv Savage RN Registered Nurse      Cord    Vessels: 3 Vessels  Complications: None  Cord around: head  Delayed cord clamping?: Yes  Cord clamped date/time: 2021 0208  Cord blood disposition: Lab  Gases sent?: Yes  Stem cell collection (by provider): No     Placenta    Date/time: 2021 02:12:00  Removal: Spontaneous  Appearance: Intact  Disposition: Refrigerator     Delivery Resuscitation    Method: Bulb Suction, Stimulation     Apgars    Living status: Living  Apgars   1 Minute:  5 Minute:  10 Minute 15 Minute 20 Minute   Skin Color: 1  1       Heart Rate: 2  2       Reflex Irritability: 2  2       Muscle Tone: 2  2       Respiratory Effort: 2  2       Total: 9  9               Apgars Assigned ByEvan Suh RN     Skin to Skin    Skin to skin initiation date/time: 21 02:07:00 EDT   Skin to skin with: Mother  Skin to skin end date/time:        Wallace Measurements    Weight: 3500 g Length: 52.1 cm   Head circumference: 33.5 cm       Delivery Information    Episiotomy: None  Perineal lacerations: 2nd Repaired: Yes   Labial laceration: right Repaired: Yes   Vaginal laceration: No    Cervical laceration: No    Vaginal delivery est. blood loss (mL): 300  Surgical or additional est. blood loss (mL): 0 (View Only): Edit in Flowsheets   Combined est. blood loss (mL): 300     Vaginal Delivery Counts    Initial count personnel: DARLYN  Initial count verified by: FLORINDA   4x4:  Needles:  Instruments:  Lap Pads:  Sponges:    Initial counts:         Final counts:         Final count personnel: Abdi Archer  Final count verified by: Daniel Duke  Accurate final count?: Yes  Final vaginal sweep completed: Yes     Other Procedures    Procedures: None     Labor Length    1st stage: 6h 31m  2nd stage: 0h 21m  3rd stage: 0h 05m    Specimen:  Placenta sent to pathology. Estimated blood loss: EBL (mL): 300 (Filed from Delivery Summary)       Condition:  infant stable to general nursery and mother stable to maternity    Blood Type and Rh: A POS    Rubella Immunity Status:   Immune           Infant Feeding:    Breast and bottlefeeding    Attending Attestation: I performed the procedure.     Andres Guan MD MD, [address] [place] [address]  2021 2:52 AM

## 2021-05-27 VITALS
DIASTOLIC BLOOD PRESSURE: 59 MMHG | BODY MASS INDEX: 37.56 KG/M2 | WEIGHT: 220 LBS | SYSTOLIC BLOOD PRESSURE: 109 MMHG | TEMPERATURE: 98 F | RESPIRATION RATE: 16 BRPM | HEART RATE: 89 BPM | HEIGHT: 64 IN | OXYGEN SATURATION: 99 %

## 2021-05-27 PROBLEM — O99.213 OBESITY AFFECTING PREGNANCY IN THIRD TRIMESTER: Status: RESOLVED | Noted: 2021-04-29 | Resolved: 2021-05-27

## 2021-05-27 PROBLEM — A74.9 CHLAMYDIA INFECTION DURING PREGNANCY: Status: RESOLVED | Noted: 2021-04-29 | Resolved: 2021-05-27

## 2021-05-27 PROBLEM — D62 ANEMIA DUE TO BLOOD LOSS, ACUTE: Status: ACTIVE | Noted: 2021-05-27

## 2021-05-27 PROBLEM — Z3A.40 40 WEEKS GESTATION OF PREGNANCY: Status: RESOLVED | Noted: 2021-05-25 | Resolved: 2021-05-27

## 2021-05-27 PROBLEM — O99.013 ANEMIA IN PREGNANCY, THIRD TRIMESTER: Status: RESOLVED | Noted: 2021-04-29 | Resolved: 2021-05-27

## 2021-05-27 PROBLEM — Z34.83 MULTIGRAVIDA IN THIRD TRIMESTER: Status: RESOLVED | Noted: 2021-04-29 | Resolved: 2021-05-27

## 2021-05-27 PROBLEM — O48.0 POST-TERM PREGNANCY, 40-42 WEEKS OF GESTATION: Status: RESOLVED | Noted: 2021-05-26 | Resolved: 2021-05-27

## 2021-05-27 PROBLEM — O98.819 CHLAMYDIA INFECTION DURING PREGNANCY: Status: RESOLVED | Noted: 2021-04-29 | Resolved: 2021-05-27

## 2021-05-27 PROBLEM — O09.293 HISTORY OF POSTPARTUM HEMORRHAGE, CURRENTLY PREGNANT IN THIRD TRIMESTER: Status: RESOLVED | Noted: 2021-04-29 | Resolved: 2021-05-27

## 2021-05-27 LAB
HCT VFR BLD CALC: 21.3 % (ref 34–48)
HCT VFR BLD CALC: 23.5 % (ref 34–48)
HEMOGLOBIN: 6 G/DL (ref 11.5–15.5)
HEMOGLOBIN: 6.5 G/DL (ref 11.5–15.5)

## 2021-05-27 PROCEDURE — 36415 COLL VENOUS BLD VENIPUNCTURE: CPT

## 2021-05-27 PROCEDURE — 6370000000 HC RX 637 (ALT 250 FOR IP): Performed by: OBSTETRICS & GYNECOLOGY

## 2021-05-27 PROCEDURE — 85018 HEMOGLOBIN: CPT

## 2021-05-27 PROCEDURE — 85014 HEMATOCRIT: CPT

## 2021-05-27 RX ORDER — PSEUDOEPHEDRINE HCL 30 MG
100 TABLET ORAL 2 TIMES DAILY PRN
COMMUNITY
Start: 2021-05-27 | End: 2022-08-24

## 2021-05-27 RX ORDER — IBUPROFEN 600 MG/1
600 TABLET ORAL EVERY 6 HOURS PRN
Qty: 30 TABLET | Refills: 0 | Status: SHIPPED | OUTPATIENT
Start: 2021-05-27 | End: 2022-08-24

## 2021-05-27 RX ORDER — MODIFIED LANOLIN
1 OINTMENT (GRAM) TOPICAL PRN
COMMUNITY
Start: 2021-05-27 | End: 2022-08-24

## 2021-05-27 RX ADMIN — ACETAMINOPHEN 650 MG: 325 TABLET ORAL at 12:59

## 2021-05-27 RX ADMIN — IBUPROFEN 600 MG: 600 TABLET, FILM COATED ORAL at 14:47

## 2021-05-27 RX ADMIN — FERROUS SULFATE TAB 325 MG (65 MG ELEMENTAL FE) 325 MG: 325 (65 FE) TAB at 08:22

## 2021-05-27 RX ADMIN — METFORMIN HYDROCHLORIDE 1 TABLET: 500 TABLET, EXTENDED RELEASE ORAL at 08:22

## 2021-05-27 RX ADMIN — ACETAMINOPHEN 650 MG: 325 TABLET ORAL at 18:58

## 2021-05-27 RX ADMIN — IBUPROFEN 600 MG: 600 TABLET, FILM COATED ORAL at 08:22

## 2021-05-27 RX ADMIN — DOCUSATE SODIUM 100 MG: 100 CAPSULE, LIQUID FILLED ORAL at 08:22

## 2021-05-27 ASSESSMENT — PAIN SCALES - GENERAL
PAINLEVEL_OUTOF10: 3
PAINLEVEL_OUTOF10: 6

## 2021-05-27 NOTE — DISCHARGE INSTR - ACTIVITY
After Your Delivery Discharge Instructions    What to do after you leave the hospital:    Recommended diet: regular diet  Recommended activity: No driving for 1 weeks, no sex or tampon use for 6 weeks. No douching. No heavy lifting (greater than baby and carrier) for 6 weeks. Initially, avoid frequent ambulation with stairs - start slowly then increase as tolerated. You may return to work in 6 weeks. Showers are fine - avoid bath tubs, hot tubs, swimming. Follow-up in 6 weeks for final postpartum visit with Dr. Placido Galeazzi. Call Dr. Anish Lopez office and arrange follow-up there, see if currently scheduled appointments are appropriate.     Call the Physician with any of these signs and symptoms:    Warning signs regarding stitches:  · \"Popping\" of stitches  · Foul smelling discharge or pus  · More redness or streaks around stitches than before    Perineum / episiotomy care:  · Continue care as in the hospital (sitz baths, squirt bottle, etc.)  · No tub baths until OK'd by your Physician , showers are fine     After your delivery - signs and symptoms to watch for:  · Fever - Oral temperature greater than 100.4 degrees Fahrenheit  · Foul-smelling vaginal discharge  · Headache unrelieved by \"pain meds\"  · Difficulty urinating  · Breasts reddened, hard, hot to the touch  · Nipple discharge which is foul-smelling or contains pus  · Increased pain at the site of the laceration repair  · Difficulty breathing with or without chest pain  · New calf pain especially if only on one side  · Sudden, continuing increased vaginal bleeding (heavier than a period) with or without clots  · Unrelieved feelings of:  · Inability to cope  · Sadness  · Anxiety  · Lack of interest in baby  · Insomnia  · Crying     What to do at home:  · Resume activity gradually   · Don't lift anything heavier than baby and carrier until OK'd by your Physician   · No sex until OK'd by your Physician  · Eat regular nutritious meals  · Take pain medication as

## 2021-05-27 NOTE — PROGRESS NOTES
Dr. Fernando Song called RE H of 6.0 and pt's wish to be discharged. To draw H+H at 3pm and call with result.

## 2021-05-27 NOTE — PROGRESS NOTES
PPD #1     Patient:  Sophie Araiza Date:  5/25/2021 10:21 AM  Medical Record Number:  [id number]   Today's Date: 5/27/2021    S: No complaints -sates she feels great and would like to go home today. She states that the hemoglobin of 6.5 is actually good for her, she has been down as low as a 4 before and functioning. She denies headaches, chest pain, shortness of breath. She has appointments for follow-up with [facility] and [facility]; tolerating diet: yes -general; ambulating well: yes -up in room in halls. Denies lightheadedness/dizziness; voiding without difficulty:  yes -no urinary complaints; bm: denies urge; flatus: yes -normal; pain meds appropriate: yes -ibuprofen and Tylenol; vaginal bleeding: Less than a period.     O:   Vitals:    05/26/21 0802 05/26/21 1543 05/26/21 2310 05/27/21 0725   BP:  (!) 120/56 116/66 (!) 109/59   Pulse:  98 88 89   Resp: 16 18 16 16   Temp: 97.5 °F (36.4 °C) 98.7 °F (37.1 °C) 98 °F (36.7 °C) 98 °F (36.7 °C)   TempSrc: Oral Oral Oral Oral   SpO2:    99%   Weight:       Height:         Gen: A&O, cooperative, pleasant   Heart: RRR   Lungs: CTA b/l   Abd; soft, NT, non distended, +BS  Back: no CVA or paraspinal tenderness   Ext: No clubbing, cyanosis, edema:no , no cords palpable, no calf tenderness   Neuro: intact   Uterus: firm, well contracted, nt   Perineum: intact, healing well   Stephanie pad: staining only, thin lochia    Lab Results   Component Value Date    HGB 6.5 (LL) 05/27/2021    HCT 23.5 (L) 05/27/2021      Recent Results (from the past 72 hour(s))   CBC    Collection Time: 05/25/21 12:22 PM   Result Value Ref Range    WBC 8.5 4.5 - 11.5 E9/L    RBC 4.10 3.50 - 5.50 E12/L    Hemoglobin 8.1 (L) 11.5 - 15.5 g/dL    Hematocrit 28.9 (L) 34.0 - 48.0 %    MCV 70.5 (L) 80.0 - 99.9 fL    MCH 19.8 (L) 26.0 - 35.0 pg    MCHC 28.0 (L) 32.0 - 34.5 %    RDW 22.4 (H) 11.5 - 15.0 fL    Platelets 532 914 - 992 E9/L    MPV 11.1 7.0 - 12.0 fL   TYPE AND SCREEN    Collection Time: 21 12:22 PM   Result Value Ref Range    ABO/Rh A POS     Antibody Screen POS    ANTIBODY IDENTIFICATION    Collection Time: 21 12:22 PM   Result Value Ref Range    Antibody ID POS, Anti-I    DIRECT ANTIGLOBULIN TEST    Collection Time: 21 12:22 PM   Result Value Ref Range    Polyspecific Pamela NEG    Doctor Notification    Collection Time: 21 12:22 PM   Result Value Ref Range    DR. RODERICK HAWK    Urine Drug Screen    Collection Time: 21 12:30 PM   Result Value Ref Range    Amphetamine Screen, Urine NOT DETECTED Negative <1000 ng/mL    Barbiturate Screen, Ur NOT DETECTED Negative < 200 ng/mL    Benzodiazepine Screen, Urine NOT DETECTED Negative < 200 ng/mL    Cannabinoid Scrn, Ur NOT DETECTED Negative < 50ng/mL    Cocaine Metabolite Screen, Urine NOT DETECTED Negative < 300 ng/mL    Opiate Scrn, Ur NOT DETECTED Negative < 300ng/mL    PCP Screen, Urine NOT DETECTED Negative < 25 ng/mL    Methadone Screen, Urine NOT DETECTED Negative <300 ng/mL    Oxycodone Urine NOT DETECTED Negative <100 ng/mL    FENTANYL SCREEN, URINE NOT DETECTED Negative <1 ng/mL    Drug Screen Comment: see below    Hemoglobin and hematocrit, blood    Collection Time: 21  5:07 AM   Result Value Ref Range    Hemoglobin 6.0 (LL) 11.5 - 15.5 g/dL    Hematocrit 21.3 (L) 34.0 - 48.0 %   Hemoglobin and Hematocrit, Blood    Collection Time: 21  3:38 PM   Result Value Ref Range    Hemoglobin 6.5 (LL) 11.5 - 15.5 g/dL    Hematocrit 23.5 (L) 34.0 - 48.0 %       A: 23 y.o. female  at 40w3d weeks -patient adamant about a day #1 discharge  PPD #1 S/P ; Episiotomy was not needed due to a spontaneous 2nd degree (subcutaneous tissue involved) perineal and a right labial laceration (5 cm)  Anemia of acute blood loss superimposed on to chronic iron deficiency anemia of pregnancy -hemodynamically stable in spite of low H&H, declines blood transfusion  History of postpartum hemorrhage with first delivery requiring blood transfusion x2  Obesity in pregnancy  History of chlamydia in pregnancy, treated with negative test of cure  Patient Active Problem List   Diagnosis    Multigravida in third trimester    Anemia in pregnancy, third trimester- HGB 7.7 at 29 weeks, 8.1 at 34 weeks. Had iron infusion x2 [facility] (last infusion 21). Recheck weekly CBC.  History of postpartum hemorrhage (del note says 350 ml), currently pregnant in third trimester-required blood tranfusion (2 units PPD#1)    Obesity affecting pregnancy in third trimester-pregravid BMI was 37    Chlamydia infection during pregnancy-treated wtih 1000 mg zithromax 21- JOSE ALFREDO 21 negative.  40 3/7 weeks gestation of pregnancy    Post-term pregnancy, 40-42 weeks of gestation    Anemia due to blood loss, acute     (normal spontaneous vaginal delivery)    Term birth of  female   Jil Ndiaye Second degree perineal laceration, delivered, current hospitalization    Obstetric right labial laceration, delivered, current hospitalization       P: Routine PP care. Encourage patient to stay until morning. She declines. Offered blood transfusion prior to discharge -patient states she is not symptomatic and does not feel this is necessary as she her count has been lower and she has been able to function in the past.  Discharge home with instructions, precautions. Advised patient to call with headaches, chest pain, shortness of breath, dizziness/lightheadedness  Prescription for Ibuprofen 600 mg. May continue over-the-counter Tylenol as needed. Continue PNV with Iron daily. Continue iron replacement as prior to admission. Follow-up with Dr. Cong Ware MD of the [facility] for continuation of management of her anemia. Follow-up office 6 weeks for postpartum visit.     Andres Guan MD [address] [place] [address]  2021 7:51 PM

## 2021-05-28 NOTE — PLAN OF CARE
Problem: Pain:  Goal: Control of acute pain  Description: Control of acute pain  5/27/2021 2023 by Caroline Kelley  Outcome: Completed  5/27/2021 1003 by Aung Cummings RN  Outcome: Met This Shift  Goal: Control of chronic pain  Description: Control of chronic pain  5/27/2021 2023 by Caroline Kelley  Outcome: Completed  5/27/2021 1003 by Aung Cummings RN  Outcome: Met This Shift     Problem: Discharge Planning:  Goal: Discharged to appropriate level of care  Description: Discharged to appropriate level of care  5/27/2021 2023 by Caroline eKlley  Outcome: Completed  5/27/2021 1003 by Aung Cummings RN  Outcome: Met This Shift     Problem: Constipation:  Goal: Bowel elimination is within specified parameters  Description: Bowel elimination is within specified parameters  5/27/2021 2023 by Caroline Kelley  Outcome: Completed  5/27/2021 1003 by Aung Cummings RN  Outcome: Met This Shift     Problem: Fluid Volume - Imbalance:  Goal: Absence of imbalanced fluid volume signs and symptoms  Description: Absence of imbalanced fluid volume signs and symptoms  5/27/2021 2023 by Caroline Kelley  Outcome: Completed  5/27/2021 1003 by Aung Cummings RN  Outcome: Met This Shift  Goal: Absence of postpartum hemorrhage signs and symptoms  Description: Absence of postpartum hemorrhage signs and symptoms  5/27/2021 2023 by Caroline Kelley  Outcome: Completed  5/27/2021 1003 by Aung Cummings RN  Outcome: Met This Shift     Problem: Infection - Risk of, Puerperal Infection:  Goal: Will show no infection signs and symptoms  Description: Will show no infection signs and symptoms  5/27/2021 2023 by Caroline Kelley  Outcome: Completed  5/27/2021 1003 by Aung Cummings RN  Outcome: Met This Shift     Problem: Mood - Altered:  Goal: Mood stable  Description: Mood stable  5/27/2021 2023 by Caroline Kelley  Outcome: Completed  5/27/2021 1003 by Aung Cummings RN  Outcome: Met This Shift     Problem: Pain - Acute:  Goal: Pain level will decrease  Description: Pain level will decrease  5/27/2021 2023 by Pepper Kessler  Outcome: Completed  5/27/2021 1003 by Cady Reeder RN  Outcome: Met This Shift

## 2021-05-28 NOTE — DISCHARGE SUMMARY
weeks 3 days, iron deficiency anemia in pregnancy requiring intravenous iron infusions, history of postpartum hemorrhage and prior pregnancy requiring blood transfusion, obesity in pregnancy, clear infection during pregnancy (treated with negative test of cure)      Patient Active Problem List   Diagnosis    Multigravida in third trimester    Anemia in pregnancy, third trimester- HGB 7.7 at 29 weeks, 8.1 at 34 weeks. Had iron infusion x2 with hematology - [facility] (last infusion 21). Recheck weekly CBC.  History of postpartum hemorrhage (del note says 350 ml), currently pregnant in third trimester-required blood tranfusion (2 units PPD#1)    Obesity affecting pregnancy in third trimester-pregravid BMI was 37    Chlamydia infection during pregnancy-treated wtih 1000 mg zithromax 21- JOS EALFREDO 21 negative.  40 3/7 weeks gestation of pregnancy    Post-term pregnancy, 40-42 weeks of gestation      Post-delivery Diagnosis:  Living  infant Female, second-degree perineal laceration, right labial laceration      Patient Active Problem List   Diagnosis    Multigravida in third trimester    Anemia in pregnancy, third trimester- HGB 7.7 at 29 weeks, 8.1 at 34 weeks. Had iron infusion x2 with hematology - [facility] (last infusion 21). Recheck weekly CBC.  History of postpartum hemorrhage (del note says 350 ml), currently pregnant in third trimester-required blood tranfusion (2 units PPD#1)    Obesity affecting pregnancy in third trimester-pregravid BMI was 37    Chlamydia infection during pregnancy-treated wtih 1000 mg zithromax 21- JOSE ALFREDO 21 negative.      40 3/7 weeks gestation of pregnancy    Post-term pregnancy, 40-42 weeks of gestation     (normal spontaneous vaginal delivery)    Term birth of  female   [facility] Second degree perineal laceration, delivered, current hospitalization    Obstetric right labial laceration, delivered, current Blood Loss (mL): 300  Surgical or Additional Est. Blood Loss (mL): 0 (View Only): Edit in Flowsheets   Combined Est. Blood Loss (mL): 300      [address] [facility] William Mcgee [[id number]]    Labor Events     labor?: No   steroids?: None  Cervical ripening date/time: 21 12:57:00   Cervical ripening type: Misoprostol  Antibiotics received during labor?: No       Rupture Identifier: Sac 1   Rupture date/time: 21 19:15:00   Rupture type: Artificial=AROM  Fluid color: Clear  Induction: Misoprostol, AROM  Indications for induction: Post-term Gestation  Augmentation: AROM  Indications for augmentation: Ineffective Contraction Pattern  Labor complications: None       Labor Event Times    Labor onset date/time: 21   Dilation complete date/time:  21 EDT   Start pushin2021       Anesthesia    Method: Epidural      Assisted Delivery Details    Forceps attempted?: No  Vacuum extractor attempted?: No      Shoulder Dystocia    Shoulder dystocia present?: No      [place] Presentation    Presentation: Vertex  Position: Left  _: Occiput  _: Anterior      [place] Information    Head delivery date/time: 2021 02:07:00         Changing the 's delivery date/time could affect patient care.:    Delivery date/time:  21   Delivery type: Vaginal, Spontaneous      Delivery Providers    Delivering clinician: Cleo Heath MD    Provider Role     Brittany Whittington RN Registered Nurse     Carrie Rodriguez RN Registered Nurse       Cord    Vessels: 3 Vessels  Complications: None  Cord around: head  Delayed cord clamping?: Yes  Cord clamped date/time: 2021  Cord blood disposition: Lab  Gases sent?: Yes  Stem cell collection (by provider):  No      Placenta    Date/time: 2021 02:12:00  Removal: Spontaneous  Appearance: Intact  Disposition: Refrigerator      Delivery Resuscitation    Method: Bulb Suction, Stimulation      Apgars    Living status: Living  Apgars    1 Minute:  5 Minute:  10 Minute 15 Minute 20 Minute   Skin Color: 1  1          Heart Rate: 2  2          Reflex Irritability: 2  2          Muscle Tone: 2  2          Respiratory Effort: 2  2          Total: 9  9                        Apgars Assigned Coni Ford RN       Skin to Skin    Skin to skin initiation date/time: 21 02:07:00 EDT   Skin to skin with: Mother        Skin to skin end date/time:           Biggers Measurements    Weight: 3500 g Length: 52.1 cm   Head circumference: 33.5 cm         Delivery Information    Episiotomy: None  Perineal lacerations: 2nd Repaired: Yes   Labial laceration: right Repaired: Yes   Vaginal laceration: No     Cervical laceration: No     Vaginal delivery est. blood loss (mL): 300  Surgical or additional est. blood loss (mL): 0 (View Only): Edit in Flowsheets   Combined est. blood loss (mL): 300      Vaginal Delivery Counts    Initial count personnel: DARLYN  Initial count verified by: [place]    4x4:  Needles:  Instruments:  Lap Pads:  Sponges:    Initial counts:              Final counts:              Final count personnel: Rosaura Melchor  Final count verified by: [place]  Accurate final count?: Yes  Final vaginal sweep completed: Yes      Other Procedures    Procedures: None      Labor Length    1st stage: 6h 31m  2nd stage: 0h 21m  3rd stage: 0h 05m     Specimen:  Placenta sent to pathology. Estimated blood loss: EBL (mL): 300 (Filed from Delivery Summary)        Condition:  infant stable to general nursery and mother stable to maternity     Blood Type and Rh: A POS     Rubella Immunity Status:   Immune           Infant Feeding:    Breast and bottlefeeding     Attending Attestation: I performed the procedure. The patient had an unremarkable Hospital Course and requested a early day 1 discharge. Her care was advanced per routine protocol.   Her vital signs were stable, she remained afebrile, and her physical exam was unremarkable FENTANYL SCREEN, URINE NOT DETECTED Negative <1 ng/mL    Drug Screen Comment: see below    Hemoglobin and hematocrit, blood    Collection Time: 05/27/21  5:07 AM   Result Value Ref Range    Hemoglobin 6.0 (LL) 11.5 - 15.5 g/dL    Hematocrit 21.3 (L) 34.0 - 48.0 %   Hemoglobin and Hematocrit, Blood    Collection Time: 05/27/21  3:38 PM   Result Value Ref Range    Hemoglobin 6.5 (LL) 11.5 - 15.5 g/dL    Hematocrit 23.5 (L) 34.0 - 48.0 %         Physicians Following Patient During Hospitalization - Reason For Care:  Admitting Physician: Chayito Lilly  Delivering Physician: Ricardo Baer Physician(s):    PP#1 Chayito Lilly  Discharging Physician: Chayito Lilly  Consultant(s): n/a    Discharge Condition:  · Level of Function:  Stable  · Caregiver Arrangements and Educational Efforts: Written Discharge Instructions were verbally reviewed and given to the Patient. · Special Problems: Patient is to follow-up with Dr. Yumi Alaniz at the [facility] for continuation of management of her anemia during the postpartum period. Call during the interim with headaches, visual changes, shortness of breath, chest pain, lightheadedness/dizziness. Plans For Continuing Care:  · Unreported Test Results and Intended Follow-Up: 6 week(s) time. · Instructions for Physical Activity: No driving for 1 week(s). No sex or tampon use for 6 weeks. No douching. No heavy lifting (greater than baby and carrier) for 6 weeks. Frequent ambulation with stairs - start slowly then increase as tolerated. Return to work in 6 weeks. Showers are fine - avoid bath tubs, hot tubs, swimming. · Instructions for Diet: Regular diet  · Discharge Medications:  Current Discharge Medication List      START taking these medications    Details   benzocaine-menthol (DERMOPLAST) 20-0.5 % AERO spray Apply topically as needed for Pain May use hospital sample at home as needed.   Refills: 0      docusate sodium (COLACE, DULCOLAX) 100 MG CAPS Take 100 mg by mouth 2 times daily as needed for Constipation      ibuprofen (ADVIL;MOTRIN) 600 MG tablet Take 1 tablet by mouth every 6 hours as needed for Pain  Qty: 30 tablet, Refills: 0      lansinoh lanolin CREA ointment Apply 1 applicator topically as needed for Dry Skin (nipple discomfort)      witch hazel-glycerin (TUCKS) pad Place rectally as needed. Refills: 0         CONTINUE these medications which have NOT CHANGED    Details   Prenatal Vit-Fe Fumarate-FA (PREPLUS) 27-1 MG TABS       clindamycin (CLEOCIN) 300 MG capsule Take 1 capsule by mouth 2 times daily for 7 days  Qty: 14 capsule, Refills: 0      ferrous sulfate (IRON 325) 325 (65 Fe) MG tablet Take 1 tablet by mouth 2 times daily  Qty: 60 tablet, Refills: 2         STOP taking these medications       famotidine (PEPCID) 20 MG tablet Comments:   Reason for Stopping:              Follow-Up Visit Plan: In 6 weeks for final postpartum visit.  Disposition: Home. Time Spent on Discharge:  30 minutes were spent in patient examination, evaluation, counseling as well as medication reconciliation, prescriptions for required medications, discharge plan and follow up.       Cleo Heath MD MD,FACOG    5/27/2021 8:05 PM

## 2021-06-19 ENCOUNTER — HOSPITAL ENCOUNTER (EMERGENCY)
Age: 21
Discharge: HOME | End: 2021-06-19
Attending: EMERGENCY MEDICINE
Payer: MEDICAID

## 2021-06-19 ENCOUNTER — APPOINTMENT (OUTPATIENT)
Dept: GENERAL RADIOLOGY | Age: 21
End: 2021-06-19
Payer: MEDICAID

## 2021-06-19 VITALS
RESPIRATION RATE: 16 BRPM | WEIGHT: 209 LBS | SYSTOLIC BLOOD PRESSURE: 127 MMHG | BODY MASS INDEX: 35.68 KG/M2 | TEMPERATURE: 97 F | OXYGEN SATURATION: 98 % | DIASTOLIC BLOOD PRESSURE: 76 MMHG | HEIGHT: 64 IN | HEART RATE: 81 BPM

## 2021-06-19 DIAGNOSIS — R06.02 SHORTNESS OF BREATH: ICD-10-CM

## 2021-06-19 DIAGNOSIS — F41.1 ANXIETY STATE: ICD-10-CM

## 2021-06-19 DIAGNOSIS — D50.9 IRON DEFICIENCY ANEMIA, UNSPECIFIED IRON DEFICIENCY ANEMIA TYPE: ICD-10-CM

## 2021-06-19 DIAGNOSIS — R20.2 PARESTHESIAS: Primary | ICD-10-CM

## 2021-06-19 LAB
ANION GAP SERPL CALCULATED.3IONS-SCNC: 8 MMOL/L (ref 7–16)
ANISOCYTOSIS: ABNORMAL
BASOPHILS ABSOLUTE: 0.05 E9/L (ref 0–0.2)
BASOPHILS RELATIVE PERCENT: 0.8 % (ref 0–2)
BUN BLDV-MCNC: 11 MG/DL (ref 6–20)
CALCIUM SERPL-MCNC: 9 MG/DL (ref 8.6–10.2)
CHLORIDE BLD-SCNC: 106 MMOL/L (ref 98–107)
CO2: 26 MMOL/L (ref 22–29)
CREAT SERPL-MCNC: 0.8 MG/DL (ref 0.5–1)
D DIMER: <200 NG/ML DDU
EOSINOPHILS ABSOLUTE: 0.31 E9/L (ref 0.05–0.5)
EOSINOPHILS RELATIVE PERCENT: 4.7 % (ref 0–6)
GFR AFRICAN AMERICAN: >60
GFR NON-AFRICAN AMERICAN: >60 ML/MIN/1.73
GLUCOSE BLD-MCNC: 95 MG/DL (ref 74–99)
HCT VFR BLD CALC: 37.4 % (ref 34–48)
HEMOGLOBIN: 10.5 G/DL (ref 11.5–15.5)
IMMATURE GRANULOCYTES #: 0.02 E9/L
IMMATURE GRANULOCYTES %: 0.3 % (ref 0–5)
LYMPHOCYTES ABSOLUTE: 2.5 E9/L (ref 1.5–4)
LYMPHOCYTES RELATIVE PERCENT: 37.7 % (ref 20–42)
MCH RBC QN AUTO: 21.5 PG (ref 26–35)
MCHC RBC AUTO-ENTMCNC: 28.1 % (ref 32–34.5)
MCV RBC AUTO: 76.5 FL (ref 80–99.9)
MONOCYTES ABSOLUTE: 0.54 E9/L (ref 0.1–0.95)
MONOCYTES RELATIVE PERCENT: 8.1 % (ref 2–12)
NEUTROPHILS ABSOLUTE: 3.22 E9/L (ref 1.8–7.3)
NEUTROPHILS RELATIVE PERCENT: 48.4 % (ref 43–80)
OVALOCYTES: ABNORMAL
PDW BLD-RTO: 22.3 FL (ref 11.5–15)
PLATELET # BLD: 377 E9/L (ref 130–450)
PMV BLD AUTO: 11 FL (ref 7–12)
POIKILOCYTES: ABNORMAL
POLYCHROMASIA: ABNORMAL
POTASSIUM REFLEX MAGNESIUM: 3.7 MMOL/L (ref 3.5–5)
RBC # BLD: 4.89 E12/L (ref 3.5–5.5)
SCHISTOCYTES: ABNORMAL
SODIUM BLD-SCNC: 140 MMOL/L (ref 132–146)
TROPONIN, HIGH SENSITIVITY: <6 NG/L (ref 0–9)
WBC # BLD: 6.6 E9/L (ref 4.5–11.5)

## 2021-06-19 PROCEDURE — 85025 COMPLETE CBC W/AUTO DIFF WBC: CPT

## 2021-06-19 PROCEDURE — 36415 COLL VENOUS BLD VENIPUNCTURE: CPT

## 2021-06-19 PROCEDURE — 85378 FIBRIN DEGRADE SEMIQUANT: CPT

## 2021-06-19 PROCEDURE — 99285 EMERGENCY DEPT VISIT HI MDM: CPT

## 2021-06-19 PROCEDURE — 84484 ASSAY OF TROPONIN QUANT: CPT

## 2021-06-19 PROCEDURE — 93005 ELECTROCARDIOGRAM TRACING: CPT | Performed by: EMERGENCY MEDICINE

## 2021-06-19 PROCEDURE — 80048 BASIC METABOLIC PNL TOTAL CA: CPT

## 2021-06-19 PROCEDURE — 71045 X-RAY EXAM CHEST 1 VIEW: CPT

## 2021-06-19 ASSESSMENT — ENCOUNTER SYMPTOMS
EYE REDNESS: 0
NAUSEA: 0
ABDOMINAL PAIN: 0
COUGH: 0
SHORTNESS OF BREATH: 1
DIARRHEA: 0
BACK PAIN: 0
VOMITING: 0
SINUS PRESSURE: 0
EYE DISCHARGE: 0
WHEEZING: 0
SORE THROAT: 0
EYE PAIN: 0

## 2021-06-19 ASSESSMENT — VISUAL ACUITY
OS: 20/25
OD: 0
OU: 20/25

## 2021-06-20 LAB
EKG ATRIAL RATE: 82 BPM
EKG P AXIS: 30 DEGREES
EKG P-R INTERVAL: 142 MS
EKG Q-T INTERVAL: 354 MS
EKG QRS DURATION: 76 MS
EKG QTC CALCULATION (BAZETT): 413 MS
EKG R AXIS: 1 DEGREES
EKG T AXIS: 10 DEGREES
EKG VENTRICULAR RATE: 82 BPM

## 2021-06-20 PROCEDURE — 93010 ELECTROCARDIOGRAM REPORT: CPT | Performed by: INTERNAL MEDICINE

## 2021-06-20 NOTE — ED PROVIDER NOTES
Extraocular Movements: Extraocular movements intact. Conjunctiva/sclera: Conjunctivae normal.      Pupils: Pupils are equal, round, and reactive to light. Cardiovascular:      Rate and Rhythm: Normal rate and regular rhythm. Pulses: Normal pulses. Heart sounds: Normal heart sounds. Pulmonary:      Effort: Pulmonary effort is normal. No respiratory distress. Breath sounds: Normal breath sounds. No stridor. No wheezing or rales. Abdominal:      General: Abdomen is flat. Bowel sounds are normal.      Palpations: Abdomen is soft. Tenderness: There is no abdominal tenderness. There is no guarding. Musculoskeletal:         General: Normal range of motion. Cervical back: Normal range of motion and neck supple. Skin:     General: Skin is warm and dry. Coloration: Skin is pale. Neurological:      General: No focal deficit present. Mental Status: She is alert and oriented to person, place, and time. Cranial Nerves: No cranial nerve deficit. Sensory: No sensory deficit. Motor: No weakness. Coordination: Coordination normal.      Comments: NIH 0          Procedures     MDM     ED Course as of Jun 19 2144   Sat Jun 19, 2021 2053 Patient visual acuity was baseline for patient, D-dimer less than 200    [JG]   2118 Patient refused Covid swab. Lab work here within patient's baseline, hemoglobin is actually improved compared to previous, MCV is low, however patient has a known history of iron deficiency anemia, and has iron pills available at home. Will discharge patient home, return precautions discussed. [JG]   2118 Patient presented emergency department for bilateral upper and lower extremity numbness and facial numbness and eye blurriness. She was ambulated without any significant dyspnea.   Visual acuity testing was normal for her, patient was neurologically intact on neuro exam.  Chest x-ray was negative, troponin was negative, D-dimer is negative, EKG was normal.  Patient will be discharged home, return precautions discussed, instructed follow-up with her OB/GYN and primary care physician. [JG]   2123 EKG: This EKG is signed by emergency department physician. Rate: 82  Rhythm: Sinus  Interpretation: non-specific EKG  Comparison: no previous EKG available         [JG]      ED Course User Index  [JG] Nelli Go MD      Patient presented emergency department for bilateral upper and lower extremity numbness and facial numbness and eye blurriness. She was ambulated without any significant dyspnea. Visual acuity testing was normal for her, patient was neurologically intact on neuro exam.  Chest x-ray was negative, troponin was negative, D-dimer is negative, EKG was normal.  Patient will be discharged home, return precautions discussed, instructed follow-up with her OB/GYN and primary care physician. ED Course as of Jun 19 2144   Sat Jun 19, 2021 2053 Patient visual acuity was baseline for patient, D-dimer less than 200    [JG]   2118 Patient refused Covid swab. Lab work here within patient's baseline, hemoglobin is actually improved compared to previous, MCV is low, however patient has a known history of iron deficiency anemia, and has iron pills available at home. Will discharge patient home, return precautions discussed. [JG]   2118 Patient presented emergency department for bilateral upper and lower extremity numbness and facial numbness and eye blurriness. She was ambulated without any significant dyspnea. Visual acuity testing was normal for her, patient was neurologically intact on neuro exam.  Chest x-ray was negative, troponin was negative, D-dimer is negative, EKG was normal.  Patient will be discharged home, return precautions discussed, instructed follow-up with her OB/GYN and primary care physician. [JG]   2123 EKG: This EKG is signed by emergency department physician.     Rate: 82  Rhythm: Sinus  Interpretation: non-specific EKG  Comparison: no previous EKG available         [JG]      ED Course User Index  [JG] Aisha Person MD       --------------------------------------------- PAST HISTORY ---------------------------------------------  Past Medical History:  has a past medical history of Anemia affecting pregnancy in third trimester, Anxiety, Asthma, Depression with suicidal ideation, and History of blood transfusion. Past Surgical History:  has no past surgical history on file. Social History:  reports that she has quit smoking. She has never used smokeless tobacco. She reports previous alcohol use. She reports previous drug use. Family History: family history is not on file. The patients home medications have been reviewed. Allergies: Patient has no known allergies.     -------------------------------------------------- RESULTS -------------------------------------------------  Labs:  Results for orders placed or performed during the hospital encounter of 06/19/21   CBC Auto Differential   Result Value Ref Range    WBC 6.6 4.5 - 11.5 E9/L    RBC 4.89 3.50 - 5.50 E12/L    Hemoglobin 10.5 (L) 11.5 - 15.5 g/dL    Hematocrit 37.4 34.0 - 48.0 %    MCV 76.5 (L) 80.0 - 99.9 fL    MCH 21.5 (L) 26.0 - 35.0 pg    MCHC 28.1 (L) 32.0 - 34.5 %    RDW 22.3 (H) 11.5 - 15.0 fL    Platelets 643 349 - 326 E9/L    MPV 11.0 7.0 - 12.0 fL    Neutrophils % 48.4 43.0 - 80.0 %    Immature Granulocytes % 0.3 0.0 - 5.0 %    Lymphocytes % 37.7 20.0 - 42.0 %    Monocytes % 8.1 2.0 - 12.0 %    Eosinophils % 4.7 0.0 - 6.0 %    Basophils % 0.8 0.0 - 2.0 %    Neutrophils Absolute 3.22 1.80 - 7.30 E9/L    Immature Granulocytes # 0.02 E9/L    Lymphocytes Absolute 2.50 1.50 - 4.00 E9/L    Monocytes Absolute 0.54 0.10 - 0.95 E9/L    Eosinophils Absolute 0.31 0.05 - 0.50 E9/L    Basophils Absolute 0.05 0.00 - 0.20 E9/L    Anisocytosis 2+     Polychromasia 1+     Poikilocytes 1+     Schistocytes 1+     Ovalocytes 1+    Basic Metabolic Panel w/ Reflex to MG   Result Value Ref Range    Sodium 140 132 - 146 mmol/L    Potassium reflex Magnesium 3.7 3.5 - 5.0 mmol/L    Chloride 106 98 - 107 mmol/L    CO2 26 22 - 29 mmol/L    Anion Gap 8 7 - 16 mmol/L    Glucose 95 74 - 99 mg/dL    BUN 11 6 - 20 mg/dL    CREATININE 0.8 0.5 - 1.0 mg/dL    GFR Non-African American >60 >=60 mL/min/1.73    GFR African American >60     Calcium 9.0 8.6 - 10.2 mg/dL   D-Dimer, Quantitative   Result Value Ref Range    D-Dimer, Quant <200 ng/mL DDU   Troponin   Result Value Ref Range    Troponin, High Sensitivity <6 0 - 9 ng/L       Radiology:  XR CHEST PORTABLE   Final Result   No pneumonia or pleural effusion.             ------------------------- NURSING NOTES AND VITALS REVIEWED ---------------------------  Date / Time Roomed:  6/19/2021  7:08 PM  ED Bed Assignment:  10/10    The nursing notes within the ED encounter and vital signs as below have been reviewed. /76   Pulse 81   Temp 97 °F (36.1 °C) (Tympanic)   Resp 16   Ht 5' 4\" (1.626 m)   Wt 209 lb (94.8 kg)   SpO2 98%   BMI 35.87 kg/m²   Oxygen Saturation Interpretation: Normal      ------------------------------------------ PROGRESS NOTES ------------------------------------------  9:44 PM EDT  I have spoken with the patient and discussed todays results, in addition to providing specific details for the plan of care and counseling regarding the diagnosis and prognosis. Their questions are answered at this time and they are agreeable with the plan. I discussed at length with them reasons for immediate return here for re evaluation. They will followup with their primary care physician by calling their office tomorrow. --------------------------------- ADDITIONAL PROVIDER NOTES ---------------------------------  At this time the patient is without objective evidence of an acute process requiring hospitalization or inpatient management.   They have remained hemodynamically stable throughout their entire ED visit and are stable for discharge with outpatient follow-up. The plan has been discussed in detail and they are aware of the specific conditions for emergent return, as well as the importance of follow-up. Discharge Medication List as of 6/19/2021  9:25 PM          Diagnosis:  1. Paresthesias    2. Iron deficiency anemia, unspecified iron deficiency anemia type    3. Shortness of breath        Disposition:  Patient's disposition: Discharge to home  Patient's condition is stable.            Willodean Kussmaul, MD  Resident  06/19/21 6145

## 2021-07-08 PROBLEM — O99.013 ANEMIA IN PREGNANCY, THIRD TRIMESTER: Status: RESOLVED | Noted: 2021-04-29 | Resolved: 2021-07-08

## 2021-07-08 PROBLEM — O99.213 OBESITY AFFECTING PREGNANCY IN THIRD TRIMESTER: Status: RESOLVED | Noted: 2021-04-29 | Resolved: 2021-07-08

## 2021-07-08 PROBLEM — O98.819 CHLAMYDIA INFECTION DURING PREGNANCY: Status: RESOLVED | Noted: 2021-04-29 | Resolved: 2021-07-08

## 2021-07-08 PROBLEM — A74.9 CHLAMYDIA INFECTION DURING PREGNANCY: Status: RESOLVED | Noted: 2021-04-29 | Resolved: 2021-07-08

## 2021-07-08 PROBLEM — O48.0 POST-TERM PREGNANCY, 40-42 WEEKS OF GESTATION: Status: RESOLVED | Noted: 2021-05-26 | Resolved: 2021-07-08

## 2021-07-08 PROBLEM — D62 ANEMIA DUE TO BLOOD LOSS, ACUTE: Status: RESOLVED | Noted: 2021-05-27 | Resolved: 2021-07-08

## 2021-07-08 PROBLEM — O09.293 HISTORY OF POSTPARTUM HEMORRHAGE, CURRENTLY PREGNANT IN THIRD TRIMESTER: Status: RESOLVED | Noted: 2021-04-29 | Resolved: 2021-07-08

## 2021-07-08 PROBLEM — Z34.83 MULTIGRAVIDA IN THIRD TRIMESTER: Status: RESOLVED | Noted: 2021-04-29 | Resolved: 2021-07-08

## 2021-07-08 PROBLEM — Z3A.40 40 WEEKS GESTATION OF PREGNANCY: Status: RESOLVED | Noted: 2021-05-25 | Resolved: 2021-07-08

## 2022-06-26 ENCOUNTER — APPOINTMENT (OUTPATIENT)
Dept: CT IMAGING | Age: 22
End: 2022-06-26
Payer: MEDICAID

## 2022-06-26 ENCOUNTER — HOSPITAL ENCOUNTER (EMERGENCY)
Age: 22
Discharge: HOME | End: 2022-06-26
Attending: EMERGENCY MEDICINE
Payer: MEDICAID

## 2022-06-26 VITALS
WEIGHT: 200 LBS | BODY MASS INDEX: 35.44 KG/M2 | DIASTOLIC BLOOD PRESSURE: 66 MMHG | TEMPERATURE: 98.2 F | OXYGEN SATURATION: 100 % | HEART RATE: 96 BPM | RESPIRATION RATE: 16 BRPM | SYSTOLIC BLOOD PRESSURE: 138 MMHG | HEIGHT: 63 IN

## 2022-06-26 DIAGNOSIS — R20.2 FACIAL PARESTHESIA: Primary | ICD-10-CM

## 2022-06-26 LAB
AMORPHOUS: ABNORMAL
ANION GAP SERPL CALCULATED.3IONS-SCNC: 10 MMOL/L (ref 7–16)
BACTERIA: ABNORMAL /HPF
BASOPHILS ABSOLUTE: 0.04 E9/L (ref 0–0.2)
BASOPHILS RELATIVE PERCENT: 0.5 % (ref 0–2)
BILIRUBIN URINE: NEGATIVE
BLOOD, URINE: NEGATIVE
BUN BLDV-MCNC: 11 MG/DL (ref 6–20)
CALCIUM SERPL-MCNC: 8.9 MG/DL (ref 8.6–10.2)
CHLORIDE BLD-SCNC: 103 MMOL/L (ref 98–107)
CLARITY: CLEAR
CO2: 24 MMOL/L (ref 22–29)
COLOR: YELLOW
CREAT SERPL-MCNC: 0.7 MG/DL (ref 0.5–1)
EOSINOPHILS ABSOLUTE: 0.32 E9/L (ref 0.05–0.5)
EOSINOPHILS RELATIVE PERCENT: 4.3 % (ref 0–6)
EPITHELIAL CELLS, UA: ABNORMAL /HPF
GFR AFRICAN AMERICAN: >60
GFR NON-AFRICAN AMERICAN: >60 ML/MIN/1.73
GLUCOSE BLD-MCNC: 95 MG/DL (ref 74–99)
GLUCOSE URINE: NEGATIVE MG/DL
HCG, URINE, POC: NEGATIVE
HCT VFR BLD CALC: 36.6 % (ref 34–48)
HEMOGLOBIN: 11 G/DL (ref 11.5–15.5)
IMMATURE GRANULOCYTES #: 0.01 E9/L
IMMATURE GRANULOCYTES %: 0.1 % (ref 0–5)
KETONES, URINE: NEGATIVE MG/DL
LEUKOCYTE ESTERASE, URINE: ABNORMAL
LYMPHOCYTES ABSOLUTE: 2.26 E9/L (ref 1.5–4)
LYMPHOCYTES RELATIVE PERCENT: 30.1 % (ref 20–42)
Lab: NORMAL
MCH RBC QN AUTO: 22.1 PG (ref 26–35)
MCHC RBC AUTO-ENTMCNC: 30.1 % (ref 32–34.5)
MCV RBC AUTO: 73.6 FL (ref 80–99.9)
MONOCYTES ABSOLUTE: 0.4 E9/L (ref 0.1–0.95)
MONOCYTES RELATIVE PERCENT: 5.3 % (ref 2–12)
NEGATIVE QC PASS/FAIL: NORMAL
NEUTROPHILS ABSOLUTE: 4.48 E9/L (ref 1.8–7.3)
NEUTROPHILS RELATIVE PERCENT: 59.7 % (ref 43–80)
NITRITE, URINE: NEGATIVE
PDW BLD-RTO: 16.8 FL (ref 11.5–15)
PH UA: 7 (ref 5–9)
PLATELET # BLD: 294 E9/L (ref 130–450)
PMV BLD AUTO: 11.3 FL (ref 7–12)
POSITIVE QC PASS/FAIL: NORMAL
POTASSIUM REFLEX MAGNESIUM: 3.7 MMOL/L (ref 3.5–5)
PROTEIN UA: NEGATIVE MG/DL
RBC # BLD: 4.97 E12/L (ref 3.5–5.5)
RBC UA: ABNORMAL /HPF (ref 0–2)
SODIUM BLD-SCNC: 137 MMOL/L (ref 132–146)
SPECIFIC GRAVITY UA: 1.01 (ref 1–1.03)
UROBILINOGEN, URINE: 1 E.U./DL
WBC # BLD: 7.5 E9/L (ref 4.5–11.5)
WBC UA: ABNORMAL /HPF (ref 0–5)

## 2022-06-26 PROCEDURE — 70450 CT HEAD/BRAIN W/O DYE: CPT

## 2022-06-26 PROCEDURE — 81001 URINALYSIS AUTO W/SCOPE: CPT

## 2022-06-26 PROCEDURE — 80048 BASIC METABOLIC PNL TOTAL CA: CPT

## 2022-06-26 PROCEDURE — 85025 COMPLETE CBC W/AUTO DIFF WBC: CPT

## 2022-06-26 PROCEDURE — 99284 EMERGENCY DEPT VISIT MOD MDM: CPT

## 2022-06-26 NOTE — Clinical Note
Dasha Marion was seen and treated in our emergency department on 6/26/2022. She may return to work on 06/28/2022. If you have any questions or concerns, please don't hesitate to call.       Chas Chowdhury, DO
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
## 2022-06-27 ASSESSMENT — ENCOUNTER SYMPTOMS
DIARRHEA: 0
ABDOMINAL PAIN: 0
COUGH: 0
BACK PAIN: 0
NAUSEA: 0
RHINORRHEA: 0
SORE THROAT: 0
VOMITING: 0
SHORTNESS OF BREATH: 0

## 2022-06-27 NOTE — ED PROVIDER NOTES
[facility]   Department of Emergency Medicine     Written by: Caity Bernardo DO  Patient Name: Paul Henson  Attending Provider: No att. providers found  Admit Date: 2022  8:20 PM  MRN: [id number]    : 2000        Chief Complaint   Patient presents with    Seizures     thinks she had a seizure yesterday, still feels tired and sore    - Chief complaint    HPI   Paul Henson is a 24 y.o. female presenting to the ED for evaluation of Seizures (thinks she had a seizure yesterday, still feels tired and sore)    Patient is presenting for evaluation after she had an episode of facial tingling yesterday while driving that lasted about 40 minutes; please note, per triage note patient reportedly told the initial provider that she had an episode of complete left-sided paralysis while driving. Patient is adamant that she did not experience such symptoms, it is unclear why this was documented but she states that she was never weak on any one specific side and was never completely paralyzed or immobilized at all; she states that she had some general weakness and fatigue along with the facial numbness/tingling and states that she felt like she had brain fog. She drove all the way to her job and sat down at her job while the symptoms persisted; she did not seek medical attention at that time and she called her mother and states that the symptoms probably lasted about 40 minutes; states that her coworkers were there and ultimately ended up sending her home and she drove home. She states that her mother has a history of epilepsy and she was worried that maybe she had a seizure because today throughout the day she has felt fatigued and had some body aches. She otherwise denies any other symptoms such as headache or changes in vision, confusion, motor deficits, gait abnormalities or abnormal coordination.   She has not had any falls or injuries, denies any fevers, neck pain or stiffness, chest pain or palpitations, shortness of breath, abdominal pain, fevers, nausea or vomiting, or abnormal urinary symptoms. Complaints overall are moderate in severity without specific aggravating or relieving factors and have not recurred since they happened yesterday. Approximately 1 year ago around the same time patient had paresthesias and was seen in the ER for this; she states she never followed up with anyone and that she does not have a family doctor. Review of Systems   Constitutional: Positive for fatigue. Negative for chills and fever. HENT: Negative for rhinorrhea and sore throat. Eyes: Negative for visual disturbance. Respiratory: Negative for cough and shortness of breath. Cardiovascular: Negative for chest pain and palpitations. Gastrointestinal: Negative for abdominal pain, diarrhea, nausea and vomiting. Genitourinary: Negative for dysuria and frequency. Musculoskeletal: Positive for myalgias. Negative for back pain and neck stiffness. Skin: Negative for rash and wound. Neurological: Positive for numbness (tingling to her face that occurred for 40 minutes yesterday; since resolved). Negative for dizziness, seizures, syncope, weakness and headaches. Psychiatric/Behavioral: Negative for confusion. All other systems reviewed and are negative. Physical Exam  Vitals and nursing note reviewed. Constitutional:       General: She is not in acute distress. Appearance: She is not ill-appearing or toxic-appearing. HENT:      Head: Normocephalic and atraumatic. Right Ear: External ear normal.      Left Ear: External ear normal.      Nose: Nose normal. No rhinorrhea. Mouth/Throat:      Mouth: Mucous membranes are moist.      Pharynx: Oropharynx is clear. Eyes:      Extraocular Movements: Extraocular movements intact. Conjunctiva/sclera: Conjunctivae normal.      Pupils: Pupils are equal, round, and reactive to light.    Cardiovascular:      Rate and Rhythm: Normal rate and regular rhythm. Pulses: Normal pulses. Heart sounds: Normal heart sounds. Pulmonary:      Effort: Pulmonary effort is normal. No respiratory distress. Breath sounds: Normal breath sounds. No wheezing or rales. Abdominal:      General: Bowel sounds are normal.      Palpations: Abdomen is soft. Tenderness: There is no abdominal tenderness. There is no right CVA tenderness, left CVA tenderness or guarding. Musculoskeletal:         General: Normal range of motion. Cervical back: Normal range of motion and neck supple. No rigidity or tenderness. Right lower leg: No edema. Left lower leg: No edema. Skin:     General: Skin is warm and dry. Capillary Refill: Capillary refill takes less than 2 seconds. Coloration: Skin is not jaundiced or pale. Findings: No rash. Neurological:      General: No focal deficit present. Mental Status: She is alert and oriented to person, place, and time. Cranial Nerves: No cranial nerve deficit. Sensory: No sensory deficit. Motor: No weakness. Coordination: Coordination normal.      Gait: Gait normal.   Psychiatric:         Mood and Affect: Mood normal.         Behavior: Behavior normal.          Procedures       [facility]     ED Course as of 06/27/22 2211   Sun Jun 26, 2022 2048 Patient denied any \"paralysis\", states she felt weak all over but states she never told the triage provider that she had left-sided weakness or paralysis. [VG]   2151 Patient ambulated multiple times in the department without issue, does not have any focal neurologic deficits.  [VG]      ED Course User Index  [VG] DO SAMI Pham    This is a 24 y.o. female presenting for evaluation of transient paresthesias and \"brain fog\" that occurred yesterday evening around 6 PM lasting about 40 minutes; she came in for evaluation today because she has been feeling fatigued and body aches and was worried that maybe she had a seizure; she does not have any history of seizures but her mother has a history of epilepsy. Please see HPI for further details. On arrival patient is alert and oriented, she is in no acute distress and is nontoxic in appearance. Vitals are stable. She has no focal neurologic deficits on examination and is ambulated in the department multiple times throughout this encounter. Labs reviewed and are generally unremarkable. CT head shows no acute abnormality. Again, please note that per initial provider contact note patient had reported complete left-sided paralysis while driving yesterday evening; patient is very adamant that this never occurred and is unsure why it was documented as such. She is adamant that she experienced brain fog and tingling numbness in her face while driving, she has been able to ambulate and drive to and from work in that time and has no persisting deficits. ABCD 2 score is low risk. Patient has no diagnosed medical conditions. She is well-appearing. I feel that she is stable and appropriate for discharge. I recommended that she follow-up outpatient with her PCP, she was referred to somebody. If the symptoms were acute continuation might need outpatient neurology evaluation. Results and plan for discharge were discussed with the patient, she voiced understanding and is amenable. Strict return precautions were discussed. I have discussed this patient with my attending, who has seen the patient and agrees with this disposition. Patient was seen and evaluated by myself and my attending No att. providers found.  Assessment and Plan discussed with attending provider, please see attestation for final plan of care.       --------------------------------------------- PAST HISTORY ---------------------------------------------  Past Medical History:  has a past medical history of Anemia affecting pregnancy in third trimester, Anxiety, Asthma, Depression with suicidal ideation, History of blood transfusion, and Second degree perineal laceration, delivered, current hospitalization. Past Surgical History:  has no past surgical history on file. Social History:  reports that she has quit smoking. She has never used smokeless tobacco. She reports previous alcohol use. She reports previous drug use. Family History: family history is not on file. The patients home medications have been reviewed. Allergies: Patient has no known allergies.     -------------------------------------------------- RESULTS -------------------------------------------------  Labs:  Results for orders placed or performed during the hospital encounter of 06/26/22   CBC with Auto Differential   Result Value Ref Range    WBC 7.5 4.5 - 11.5 E9/L    RBC 4.97 3.50 - 5.50 E12/L    Hemoglobin 11.0 (L) 11.5 - 15.5 g/dL    Hematocrit 36.6 34.0 - 48.0 %    MCV 73.6 (L) 80.0 - 99.9 fL    MCH 22.1 (L) 26.0 - 35.0 pg    MCHC 30.1 (L) 32.0 - 34.5 %    RDW 16.8 (H) 11.5 - 15.0 fL    Platelets 169 239 - 364 E9/L    MPV 11.3 7.0 - 12.0 fL    Neutrophils % 59.7 43.0 - 80.0 %    Immature Granulocytes % 0.1 0.0 - 5.0 %    Lymphocytes % 30.1 20.0 - 42.0 %    Monocytes % 5.3 2.0 - 12.0 %    Eosinophils % 4.3 0.0 - 6.0 %    Basophils % 0.5 0.0 - 2.0 %    Neutrophils Absolute 4.48 1.80 - 7.30 E9/L    Immature Granulocytes # 0.01 E9/L    Lymphocytes Absolute 2.26 1.50 - 4.00 E9/L    Monocytes Absolute 0.40 0.10 - 0.95 E9/L    Eosinophils Absolute 0.32 0.05 - 0.50 E9/L    Basophils Absolute 0.04 0.00 - 0.20 U0/R   Basic Metabolic Panel w/ Reflex to MG   Result Value Ref Range    Sodium 137 132 - 146 mmol/L    Potassium reflex Magnesium 3.7 3.5 - 5.0 mmol/L    Chloride 103 98 - 107 mmol/L    CO2 24 22 - 29 mmol/L    Anion Gap 10 7 - 16 mmol/L    Glucose 95 74 - 99 mg/dL    BUN 11 6 - 20 mg/dL    CREATININE 0.7 0.5 - 1.0 mg/dL    GFR Non-African American >60 >=60 mL/min/1.73    GFR African American >60     Calcium here for re evaluation. They will followup with their primary care physician by calling their office tomorrow. --------------------------------- ADDITIONAL PROVIDER NOTES ---------------------------------  At this time the patient is without objective evidence of an acute process requiring hospitalization or inpatient management. They have remained hemodynamically stable throughout their entire ED visit and are stable for discharge with outpatient follow-up. The plan has been discussed in detail and they are aware of the specific conditions for emergent return, as well as the importance of follow-up. Discharge Medication List as of 6/26/2022 10:52 PM          Diagnosis:  1. Facial paresthesia        Disposition:  Patient's disposition: Discharge to home  Patient's condition is stable.        Tyrell Riggs DO  Resident  06/27/22 1363

## 2023-01-11 ENCOUNTER — INITIAL PRENATAL (OUTPATIENT)
Dept: OBGYN CLINIC | Age: 23
End: 2023-01-11
Payer: MEDICAID

## 2023-01-11 ENCOUNTER — ANCILLARY PROCEDURE (OUTPATIENT)
Dept: OBGYN CLINIC | Age: 23
End: 2023-01-11
Payer: MEDICAID

## 2023-01-11 VITALS
WEIGHT: 233 LBS | HEART RATE: 136 BPM | BODY MASS INDEX: 41.29 KG/M2 | SYSTOLIC BLOOD PRESSURE: 121 MMHG | HEIGHT: 63 IN | DIASTOLIC BLOOD PRESSURE: 83 MMHG

## 2023-01-11 DIAGNOSIS — O35.EXX0 PYELECTASIS OF FETUS ON PRENATAL ULTRASOUND: ICD-10-CM

## 2023-01-11 DIAGNOSIS — Z34.90 THIRD PREGNANCY: ICD-10-CM

## 2023-01-11 DIAGNOSIS — Z3A.29 29 WEEKS GESTATION OF PREGNANCY: Primary | ICD-10-CM

## 2023-01-11 DIAGNOSIS — Q24.8 OTHER SPECIFIED CONGENITAL MALFORMATIONS OF HEART: ICD-10-CM

## 2023-01-11 DIAGNOSIS — O30.043 DICHORIONIC DIAMNIOTIC TWIN PREGNANCY IN THIRD TRIMESTER: ICD-10-CM

## 2023-01-11 DIAGNOSIS — O35.9XX1 SUSPECTED FETAL ANOMALY, ANTEPARTUM, FETUS 1 OF MULTIPLE GESTATION: ICD-10-CM

## 2023-01-11 PROBLEM — D62 ANEMIA DUE TO BLOOD LOSS, ACUTE: Status: RESOLVED | Noted: 2021-05-27 | Resolved: 2023-01-11

## 2023-01-11 LAB
GLUCOSE URINE, POC: NORMAL
PROTEIN UA: POSITIVE

## 2023-01-11 PROCEDURE — 76805 OB US >/= 14 WKS SNGL FETUS: CPT | Performed by: OBSTETRICS & GYNECOLOGY

## 2023-01-11 PROCEDURE — 76810 OB US >/= 14 WKS ADDL FETUS: CPT | Performed by: OBSTETRICS & GYNECOLOGY

## 2023-01-11 PROCEDURE — 99213 OFFICE O/P EST LOW 20 MIN: CPT | Performed by: OBSTETRICS & GYNECOLOGY

## 2023-01-11 PROCEDURE — 81002 URINALYSIS NONAUTO W/O SCOPE: CPT | Performed by: OBSTETRICS & GYNECOLOGY

## 2023-01-11 NOTE — PROGRESS NOTES
Patient here for prenatal ultrasound   States that she does have some cramping mostly after she eats and she feels like she leaks fluid at night   +FM noted x2  Pt states that her heart rate does run higher around 110, states that it feels like it races

## 2023-01-11 NOTE — PATIENT INSTRUCTIONS

## 2023-01-11 NOTE — PROGRESS NOTES
[address] FETAL MEDICINE  [address] [facility] [address] [phone]   [facility] [address] FETAL MEDICINE  [address] [facility] [place] [facility] [address]  Dept: [phone]  Loc: [phone]     2023    RE:  Augusto Hayes     : 2000   AGE: 20 y.o. Dear Dr. Mardy Dance,    Thank you for allowing me to see Augusto Haeys. As I'm sure you will recall, Augusto Hayes is a 20 y.o. [id number]'s last menstrual period was 2022.  Estimated Date of Delivery: 3/28/23 at 29w1d seen in our office today for the following:    REASON FOR VISIT: Level II    Patient Active Problem List    Diagnosis Date Noted    29 weeks gestation of pregnancy 2023    Dichorionic diamniotic twin pregnancy in third trimester 2023    Other specified congenital malformations of heart 2023    Pyelectasis of fetus on prenatal ultrasound 2023    Third pregnancy 2023    BMI 40.0-44.9, adult ([facility] [address] 2023        PAST HISTORY:  OB History    Para Term  AB Living   3 2 2     2   SAB IAB Ectopic Molar Multiple Live Births           0 2      # Outcome Date GA Lbr Gaston/2nd Weight Sex Delivery Anes PTL Lv   3 Current            2 Term 20 40w3d 06:31 / 00:21 7 lb 11.5 oz (3.5 kg) F Vag-Spont EPI N DEBRA   1 Term 10/12/16 40w1d  7 lb 9 oz (3.43 kg) M Vag-Spont EPI N DEBRA          MEDICAL:  Past Medical History:   Diagnosis Date    Anemia affecting pregnancy in third trimester 10/11/2016    Anemia due to blood loss, acute 2021    Anxiety     Asthma     Depression with suicidal ideation 11/15/2019    History of blood transfusion     x3 in 2016 after birth of son    Other specified congenital malformations of heart 2023    Second degree perineal laceration, delivered, current hospitalization 2021 SURGICAL:  History reviewed. No pertinent surgical history. ALLERGIES:    No Known Allergies      MEDICATIONS:    Current Outpatient Medications   Medication Sig Dispense Refill    ferrous sulfate (IRON 325) 325 (65 Fe) MG tablet Take 1 tablet by mouth 2 times daily 60 tablet 2    doxyLAMINE succinate (GNP SLEEP AID) 25 MG tablet Take 1 tablet by mouth nightly 30 tablet 5    Prenatal Vit-Fe Fumarate-FA (PREPLUS) 27-1 MG TABS       vitamin D3 (CHOLECALCIFEROL) 25 MCG (1000 UT) TABS tablet Take 2 tablets by mouth daily (Patient not taking: No sig reported) 30 tablet 5    vitamin B-6 (PYRIDOXINE) 50 MG tablet Take 1 tablet by mouth nightly (Patient not taking: Reported on 1/11/2023) 30 tablet 5     No current facility-administered medications for this visit. Social History     Socioeconomic History    Marital status: Single     Spouse name: None    Number of children: None    Years of education: None    Highest education level: None   Tobacco Use    Smoking status: Former    Smokeless tobacco: Never   Vaping Use    Vaping Use: Never used   Substance and Sexual Activity    Alcohol use: Not Currently    Drug use: Not Currently    Sexual activity: Yes     Partners: Male          FAMILY MEDICAL HISTORY:   History reviewed. No pertinent family history. PHYSICAL EXAMINATION:  /83 (Position: Sitting)   Pulse (!) 136   Ht 5' 3\" (1.6 m)   Wt 233 lb (105.7 kg)   LMP 06/21/2022   Body mass index is 41.27 kg/m². Urine dipstick:  Results for POC orders placed in visit on 01/11/23   POCT urine qual dipstick protein   Result Value Ref Range    Protein, UA Positive (A) Negative   POCT urine qual dipstick glucose   Result Value Ref Range    Glucose, UA POC neg         An ultrasound evaluation was done in our office today. Please refer to the enclosed copy of the ultrasound report for further information. Discussion:  Is a dichorionic diamniotic twin gestation.   The babies are in a vertex vertex presentation. Fetal cardiac motion, fetal motion, and fetal tone was observed and appeared to be grossly normal.  There has been appropriate interval growth. The babies are AGA. Baby A is at the 48th percentile baby B is at the 43rd percentile. Unfortunately we were unable to get good views of baby A's heart but I agree that the left ventricle does appear to be a little bit small. Baby B also had minimal pyelectasis. No other additional anomalies were noted. The babies are concordant. The amniotic fluid volumes are equal and symmetrical.  The placentas are posterior and anterior fundal respectively. IMPRESSION:  Twin dichorionic diamniotic intrauterine gestation at 29w1d weeks with fetal biometry consistent with dates. 2. The fetal anatomy appears normal for both fetuses with the slightly smaller ventricle and baby A and the minimal pyelectasis and baby B  3. The twins are concordant. 4. The amniotic fluid volume is equal and symmetrical and the placenta is posterior and anterior fundal.    RECOMMENDATIONS:  Each of the recommendations were discussed with the patient:  1. Twin dichorionic diamniotic intrauterine gestation at 31w4d weeks with fetal biometry consistent with dates   2. I have ordered fetal echocardiograms on both babies. 3.  I would recommend serial growth every 4 weeks. 4.  I would recommend biophysical profiles with NSTs weekly at 32 to 33 weeks gestation. 5.  Delivery at 37 to 38 weeks gestation. 6.  Follow-up would be otherwise as clinically indicated. The patient is to continue to follow with you in your office for ongoing obstetric care. PLAN:    As noted above or sooner prn.     Sincerely,        Liberty Barthel, MD

## 2023-01-26 ENCOUNTER — HOSPITAL ENCOUNTER (OUTPATIENT)
Age: 23
Discharge: ANOTHER HOSPITAL | End: 2023-01-26
Attending: OBSTETRICS & GYNECOLOGY | Admitting: OBSTETRICS & GYNECOLOGY
Payer: MEDICAID

## 2023-01-26 ENCOUNTER — APPOINTMENT (OUTPATIENT)
Dept: GENERAL RADIOLOGY | Age: 23
End: 2023-01-26
Payer: MEDICAID

## 2023-01-26 ENCOUNTER — HOSPITAL ENCOUNTER (EMERGENCY)
Age: 23
Discharge: HOME | End: 2023-01-26
Attending: EMERGENCY MEDICINE
Payer: MEDICAID

## 2023-01-26 VITALS
OXYGEN SATURATION: 98 % | TEMPERATURE: 98.4 F | BODY MASS INDEX: 41.29 KG/M2 | RESPIRATION RATE: 16 BRPM | HEIGHT: 63 IN | SYSTOLIC BLOOD PRESSURE: 117 MMHG | DIASTOLIC BLOOD PRESSURE: 72 MMHG | HEART RATE: 98 BPM | WEIGHT: 233 LBS

## 2023-01-26 VITALS
RESPIRATION RATE: 18 BRPM | DIASTOLIC BLOOD PRESSURE: 56 MMHG | HEART RATE: 117 BPM | TEMPERATURE: 98 F | OXYGEN SATURATION: 97 % | SYSTOLIC BLOOD PRESSURE: 113 MMHG

## 2023-01-26 DIAGNOSIS — O30.003 TWIN GESTATION IN THIRD TRIMESTER, UNSPECIFIED MULTIPLE GESTATION TYPE: ICD-10-CM

## 2023-01-26 DIAGNOSIS — R06.00 DYSPNEA, UNSPECIFIED TYPE: Primary | ICD-10-CM

## 2023-01-26 PROCEDURE — 71046 X-RAY EXAM CHEST 2 VIEWS: CPT

## 2023-01-26 PROCEDURE — 99211 OFF/OP EST MAY X REQ PHY/QHP: CPT

## 2023-01-26 PROCEDURE — 99283 EMERGENCY DEPT VISIT LOW MDM: CPT

## 2023-01-26 ASSESSMENT — PAIN DESCRIPTION - FREQUENCY: FREQUENCY: CONTINUOUS

## 2023-01-26 ASSESSMENT — PAIN DESCRIPTION - PAIN TYPE: TYPE: ACUTE PAIN

## 2023-01-26 ASSESSMENT — PAIN - FUNCTIONAL ASSESSMENT: PAIN_FUNCTIONAL_ASSESSMENT: 0-10

## 2023-01-26 ASSESSMENT — PAIN SCALES - GENERAL: PAINLEVEL_OUTOF10: 4

## 2023-01-26 ASSESSMENT — PAIN DESCRIPTION - DESCRIPTORS: DESCRIPTORS: DISCOMFORT

## 2023-01-26 ASSESSMENT — PAIN DESCRIPTION - ONSET: ONSET: ON-GOING

## 2023-01-26 ASSESSMENT — PAIN DESCRIPTION - LOCATION: LOCATION: CHEST

## 2023-01-26 ASSESSMENT — PAIN DESCRIPTION - ORIENTATION: ORIENTATION: MID

## 2023-01-26 NOTE — H&P
Department of Obstetrics and Gynecology  Attending Obstetrics History and Physical      HISTORY OF PRESENT ILLNESS:      The patient is a 20 y.o.  3 parity 2 at 31 weeks 2 days. Twin gestation complaining of decreased fm today but now normal fm. Also dyspnea , fast heart rate and chest pain today. No fever , cough or leg pain. Estimated Due Date:  3/28/23  Contractions:  no   Leaking of fluid: no  Blleeding:  No    PRENATAL CARE:    Complications: No      Active Problems:    * No active hospital problems. *  Resolved Problems:    * No resolved hospital problems. *        PAST OB HISTORY    OB History    Para Term  AB Living   3 2 2     2   SAB IAB Ectopic Molar Multiple Live Births           0 2      # Outcome Date GA Lbr Gaston/2nd Weight Sex Delivery Anes PTL Lv   3 Current            2 Term 20 40w3d 06:31 / 00:21 7 lb 11.5 oz (3.5 kg) F Vag-Spont EPI N DEBRA   1 Term 10/12/16 40w1d  7 lb 9 oz (3.43 kg) M Vag-Spont EPI N DEBRA           Pre-eclampsia:  No      :  No      D & C:  No      Cerclage:  No      LEEP:  No      Myomectomy:  No       Labor: No    Past Medical History:    Past Medical History:   Diagnosis Date    Anemia affecting pregnancy in third trimester 10/11/2016    Anemia due to blood loss, acute 2021    Anxiety     Asthma     Depression with suicidal ideation 11/15/2019    History of blood transfusion     x3 in 2016 after birth of son    Other specified congenital malformations of heart 2023    Second degree perineal laceration, delivered, current hospitalization 2021        Past Surgical History:    History reviewed. No pertinent surgical history. Past Family History:  History reviewed. No pertinent family history. ROS:  Const: No fever, chills, night sweats, no recent unexplained weight gain/loss  HEENT: No blurred vision, double vision; no ear problems; no sore throat, congestion; no running nose.   Resp: No cough, no sputum, no pleuritic chest pain, no sob  Cardio: No chest pain, no exertional dyspnea, no PND, no orthopnea, no palpitation, no leg swelling. GI: No dysphagia, no reflux; no abdominal pain, no n/v; no c/d. No hematochezia    : No dysuria, no frequency, hesitancy; no hematuria  MSK: no joint pain, no myalgia, no change in ROM  Neuro: no focal weakness in extremities, no slurred speech, no double vision, no numbness or tingling in extremities  Endo: no heat/cold intolerance, no polyphagia, polydipsia or polyuria  Hem: no increased bleeding, no bruising, no lymphadenopathy  Skin: no skin changes  Psych: no depressed mood, no suicidal ideation    Social History:     reports that she has quit smoking. She has never used smokeless tobacco. She reports that she does not currently use alcohol. She reports that she does not currently use drugs. Medications Prior to Admission:  Medications Prior to Admission: ferrous sulfate (IRON 325) 325 (65 Fe) MG tablet, Take 1 tablet by mouth 2 times daily  vitamin D3 (CHOLECALCIFEROL) 25 MCG (1000 UT) TABS tablet, Take 2 tablets by mouth daily (Patient not taking: No sig reported)  doxyLAMINE succinate (GNP SLEEP AID) 25 MG tablet, Take 1 tablet by mouth nightly (Patient not taking: Reported on 1/20/2023)  vitamin B-6 (PYRIDOXINE) 50 MG tablet, Take 1 tablet by mouth nightly (Patient not taking: No sig reported)  Prenatal Vit-Fe Fumarate-FA (PREPLUS) 27-1 MG TABS,     Allergies:  Patient has no known allergies. PHYSICAL EXAM:  BP (!) 113/56   Pulse (!) 117   Temp 98 °F (36.7 °C) (Oral)   Resp 18   LMP 06/21/2022   SpO2 97%   General appearance: Comfortable  Lungs:  CTA   Heart:  Regular Rhythm  Abdomen:  Soft, non-tender, gravid  Fetal heart rate:  baby A and baby B reactive    Contraction frequency:  none  Membranes:  Intact      ASSESSMENT     IUP at 31 weeks. Twins  One fetus with coarctation of aorta. To deliver in [place]  Decreased fm.  Nst reactive on both babies  Dyspnea, chest pain and tachycardia         Plan: discussed with dr davis. Discharge to emergency room to evaluate above symptoms  Patient notified and instructed to go to emergency room now        Electronically signed by Gorge Cleary MD on 1/26/2023 at 5:30 PM

## 2023-01-26 NOTE — ED TRIAGE NOTES
Department of Emergency Medicine  FIRST PROVIDER TRIAGE NOTE             Independent MLP           1/26/23  5:59 PM EST    Date of Encounter: 1/26/23   MRN: [id number]      HPI: Tyosn Rucker is a 20 y.o. female who presents to the ED for Shortness of Breath (31 weeks pregnant , sent from [facility] ) and Chest Congestion   No fevers or sick contacts    ROS: Negative for abd pain, fever, or vomiting. PE: Gen Appearance/Constitutional: alert  HEENT: NC/NT. PERRLA,  Airway patent. Pulm: CTA bilat     Initial Plan of Care: All treatment areas with department are currently occupied. Plan to order/Initiate the following while awaiting opening in ED: imaging studies.   Initiate Treatment-Testing, Proceed toTreatment Area When Bed Available for ED Attending/MLP to Continue Care    Electronically signed by JAMEEL Farmer CNP   DD: 1/26/23

## 2023-01-27 ENCOUNTER — ROUTINE PRENATAL (OUTPATIENT)
Dept: OBGYN CLINIC | Age: 23
End: 2023-01-27
Payer: MEDICAID

## 2023-01-27 VITALS
DIASTOLIC BLOOD PRESSURE: 85 MMHG | HEART RATE: 116 BPM | SYSTOLIC BLOOD PRESSURE: 123 MMHG | BODY MASS INDEX: 41.81 KG/M2 | WEIGHT: 236 LBS

## 2023-01-27 DIAGNOSIS — O30.043 DICHORIONIC DIAMNIOTIC TWIN PREGNANCY IN THIRD TRIMESTER: Primary | ICD-10-CM

## 2023-01-27 DIAGNOSIS — O35.BXX0 COARCTATION OF AORTA, FETAL, AFFECTING CARE OF MOTHER: ICD-10-CM

## 2023-01-27 PROBLEM — Z3A.31 31 WEEKS GESTATION OF PREGNANCY: Status: ACTIVE | Noted: 2023-01-11

## 2023-01-27 LAB
GLUCOSE URINE, POC: NORMAL
PROTEIN UA: NEGATIVE

## 2023-01-27 PROCEDURE — 81002 URINALYSIS NONAUTO W/O SCOPE: CPT | Performed by: OBSTETRICS & GYNECOLOGY

## 2023-01-27 PROCEDURE — 99213 OFFICE O/P EST LOW 20 MIN: CPT | Performed by: OBSTETRICS & GYNECOLOGY

## 2023-01-27 NOTE — PROGRESS NOTES
[address] FETAL MEDICINE  [address] [place] [id number] [phone]   [facility] [address] FETAL MEDICINE  [address] [facility] [address]  Dept: [phone]  Loc: [phone]     2023    RE:  Tyson Rucker     : 2000   AGE: 20 y.o. Dear Dr. Yusuf Lange,    Thank you for allowing me to see Tyson uRcker. As I'm sure you will recall, Tyson Rucker is a 20 y.o. [id number]'s last menstrual period was 2022.  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 2023    31 weeks gestation of pregnancy 2023    Dichorionic diamniotic twin pregnancy in third trimester 2023    Other specified congenital malformations of heart 2023    Pyelectasis of fetus on prenatal ultrasound 2023    Third pregnancy 2023    BMI 40.0-44.9, adult ([address] 2023        PAST HISTORY:  OB History    Para Term  AB Living   3 2 2     2   SAB IAB Ectopic Molar Multiple Live Births           0 2      # Outcome Date GA Lbr Gaston/2nd Weight Sex Delivery Anes PTL Lv   3 Current            2 Term 20 40w3d 06:31 / 00:21 7 lb 11.5 oz (3.5 kg) F Vag-Spont EPI N DEBRA   1 Term 10/12/16 40w1d  7 lb 9 oz (3.43 kg) M Vag-Spont EPI N DEBRA          MEDICAL:  Past Medical History:   Diagnosis Date    Anemia affecting pregnancy in third trimester 10/11/2016    Anemia due to blood loss, acute 2021    Anxiety     Asthma     Depression with suicidal ideation 11/15/2019    History of blood transfusion     x3 in 2016 after birth of son    Other specified congenital malformations of heart 2023 Second degree perineal laceration, delivered, current hospitalization 05/26/2021        SURGICAL:  No past surgical history on file. ALLERGIES:    No Known Allergies      MEDICATIONS:    Current Outpatient Medications   Medication Sig Dispense Refill    ferrous sulfate (IRON 325) 325 (65 Fe) MG tablet Take 1 tablet by mouth 2 times daily 60 tablet 2    Prenatal Vit-Fe Fumarate-FA (PREPLUS) 27-1 MG TABS       vitamin D3 (CHOLECALCIFEROL) 25 MCG (1000 UT) TABS tablet Take 2 tablets by mouth daily (Patient not taking: No sig reported) 30 tablet 5    doxyLAMINE succinate (GNP SLEEP AID) 25 MG tablet Take 1 tablet by mouth nightly (Patient not taking: Reported on 1/20/2023) 30 tablet 5    vitamin B-6 (PYRIDOXINE) 50 MG tablet Take 1 tablet by mouth nightly (Patient not taking: No sig reported) 30 tablet 5     No current facility-administered medications for this visit. Social History     Socioeconomic History    Marital status: Single   Tobacco Use    Smoking status: Former    Smokeless tobacco: Never   Vaping Use    Vaping Use: Never used   Substance and Sexual Activity    Alcohol use: Not Currently    Drug use: Not Currently    Sexual activity: Yes     Partners: Male          FAMILY MEDICAL HISTORY:   No family history on file. PHYSICAL EXAMINATION:  /85   Pulse (!) 116   Wt 236 lb (107 kg)   LMP 06/21/2022   Body mass index is 41.81 kg/m². Urine dipstick:  Results for POC orders placed in visit on 01/27/23   POCT urine qual dipstick protein   Result Value Ref Range    Protein, UA Negative Negative   POCT urine qual dipstick glucose   Result Value Ref Range    Glucose, UA POC neg             Discussion:  Had a very long discussion with Georgina and her significant other regarding the findings of the fetal echocardiogram.  It appears that baby A has coarctation of the aorta which is because the right side of the baby's heart to be a little bit larger than the left side.   I discussed the fact that it was very repairable and then it was very important to know ahead of time because the baby could have had significant problems afterwards. I told him that delivery was recommended in [place]. If they needed to have surgery it would be done in [place]. I think they have a very good understanding of what is going on. It is a very repairable condition. Think the baby will do well    IMPRESSION:  1. Dichorionic diamniotic twin intrauterine gestation at 35+ weeks with baby A having a cardiac/aortic malformation. Bruce Joel RECOMMENDATIONS:  Each of the recommendations were discussed with the patient:  1. Return in 2 weeks for growth ultrasound  2. Begin weekly biophysical profiles with NSTs in 2 weeks. 3.  Delivery between 37 and 38 weeks gestation. 4.  Delivery in [place] if possible to that the baby may need Prostin afterwards  5. Follow-up would be otherwise as clinically indicated. The patient is to continue to follow with you in your office for ongoing obstetric care. PLAN:    As noted above or sooner prn. Sincerely,        Davida Sawant MD    I spent 40 minutes of direct contact time with the patient of which greater than 50% of the time was used to  the patient, discuss complications and problems related to her pregnancy, or coordinating her care. I answered all of her questions to her satisfaction.

## 2023-01-27 NOTE — ED PROVIDER NOTES
HPI:  1/26/23,   Time: 7:28 PM ANDRES Baxter is a 20 y.o. female presenting to the ED for evaluation of difficulty breathing from time to time when she lays down especially. Patient is 31 weeks gestation with twins. She is had no cough. She is had no hemoptysis. She denies chest pain. She states her symptoms are also improved with standing and with cracking her window and letting cold air in. ROS:   Pertinent positives and negatives are stated within HPI, all other systems reviewed and are negative.  --------------------------------------------- PAST HISTORY ---------------------------------------------  Past Medical History:  has a past medical history of Anemia affecting pregnancy in third trimester, Anemia due to blood loss, acute, Anxiety, Asthma, Depression with suicidal ideation, History of blood transfusion, Other specified congenital malformations of heart, and Second degree perineal laceration, delivered, current hospitalization. Past Surgical History:  has no past surgical history on file. Social History:  reports that she has quit smoking. She has never used smokeless tobacco. She reports that she does not currently use alcohol. She reports that she does not currently use drugs. Family History: family history is not on file. The patients home medications have been reviewed. Allergies: Patient has no known allergies. -------------------------------------------------- RESULTS -------------------------------------------------  All laboratory and radiology results have been personally reviewed by myself   LABS:  No results found for this visit on 01/26/23. RADIOLOGY:  Interpreted by Radiologist.  XR CHEST (2 VW)   Final Result   No acute process. ------------------------- NURSING NOTES AND VITALS REVIEWED ---------------------------   The nursing notes within the ED encounter and vital signs as below have been reviewed.    /64   Pulse (!) 113 Temp 98.4 °F (36.9 °C) (Oral)   Resp 16   Ht 5' 3\" (1.6 m)   Wt 233 lb (105.7 kg)   LMP 06/21/2022   SpO2 97%   BMI 41.27 kg/m²   Oxygen Saturation Interpretation: Normal      ---------------------------------------------------PHYSICAL EXAM--------------------------------------      Constitutional/General: Alert and oriented x3, well appearing, non toxic in NAD  Head: NC/AT  Eyes: PERRL, EOMI  Nose:  Nares patent. No congestion or discharge noted. Ears:  TM's intact without erythema or perforation. External canal without swelling  Mouth: Oropharynx clear, handling secretions, no trismus  Neck: Supple, full ROM, no meningeal signs  Pulmonary: Lungs Clear to auscultation bilaterally. No rales or rhonchi or wheezes noted. No retractions. Cardiovascular:  Regular rate and rhythm, no murmurs, gallops, or rubs. 2+ symmetric distal pulses   Chest:  No tenderness, deformity or crepitus  Abdomen: Gravid abdomen. Nontender. Back:  No tenderness to palpation on the cervical or thoracic or lumbar spine  Extremities: Moves all extremities x 4. Warm and well perfused  Skin: warm and dry without rash  Neurologic: GCS 15, no focal acute neurological deficit  Psych: Normal Affect      ------------------------------ ED COURSE/MEDICAL DECISION MAKING----------------------  Medications - No data to display         Medical Decision Making:    I believe the patient's intermittent dyspnea is due to her gravid abdomen. She is comfortable with this disposition. Her lungs are clear. She has no dyspnea on exertion otherwise. She will be discharged home. Patient advised to return to the emergency department should symptoms worsen. Advised to contact primary care physician to secure follow-up appointment within the next 1-2 days. --------------------------------- IMPRESSION AND DISPOSITION ---------------------------------    IMPRESSION  1. Dyspnea, unspecified type    2.  Twin gestation in third trimester, unspecified multiple gestation type        DISPOSITION  Disposition: Discharge to home  Patient condition is good        Cornelia Mooreate, DO  01/26/23 1932

## 2023-01-27 NOTE — PROGRESS NOTES
Patient is here today for 2 wk f/u. Denies any vaginal bleeding, cramping, or leakage of fluids. Patient reports good fetal movement in both babies.

## 2023-01-27 NOTE — ED NOTES
Patient was on discharge and left without receiving discharge paperwork. Patient had no IV in, vitals were stable, and patient showed no signs of distress.       Michael Streeter RN  01/26/23 [id number]

## 2023-02-20 ENCOUNTER — HOSPITAL ENCOUNTER (OUTPATIENT)
Age: 23
Setting detail: OBSERVATION
Discharge: HOME | End: 2023-02-21
Attending: OBSTETRICS & GYNECOLOGY | Admitting: OBSTETRICS & GYNECOLOGY
Payer: MEDICAID

## 2023-02-20 LAB
ALBUMIN SERPL-MCNC: 2.9 G/DL (ref 3.5–5.2)
ALP BLD-CCNC: 152 U/L (ref 35–104)
ALT SERPL-CCNC: 9 U/L (ref 0–32)
ANION GAP SERPL CALCULATED.3IONS-SCNC: 9 MMOL/L (ref 7–16)
AST SERPL-CCNC: 11 U/L (ref 0–31)
BASOPHILS ABSOLUTE: 0.03 E9/L (ref 0–0.2)
BASOPHILS RELATIVE PERCENT: 0.4 % (ref 0–2)
BILIRUB SERPL-MCNC: 0.5 MG/DL (ref 0–1.2)
BUN BLDV-MCNC: 3 MG/DL (ref 6–20)
CALCIUM SERPL-MCNC: 8.3 MG/DL (ref 8.6–10.2)
CHLORIDE BLD-SCNC: 104 MMOL/L (ref 98–107)
CO2: 22 MMOL/L (ref 22–29)
CREAT SERPL-MCNC: 0.4 MG/DL (ref 0.5–1)
EOSINOPHILS ABSOLUTE: 0.19 E9/L (ref 0.05–0.5)
EOSINOPHILS RELATIVE PERCENT: 2.4 % (ref 0–6)
GFR SERPL CREATININE-BSD FRML MDRD: >60 ML/MIN/1.73
GLUCOSE BLD-MCNC: 74 MG/DL (ref 74–99)
HCT VFR BLD CALC: 37.6 % (ref 34–48)
HEMOGLOBIN: 11.9 G/DL (ref 11.5–15.5)
IMMATURE GRANULOCYTES #: 0.06 E9/L
IMMATURE GRANULOCYTES %: 0.7 % (ref 0–5)
LYMPHOCYTES ABSOLUTE: 1.94 E9/L (ref 1.5–4)
LYMPHOCYTES RELATIVE PERCENT: 24.2 % (ref 20–42)
MCH RBC QN AUTO: 26.7 PG (ref 26–35)
MCHC RBC AUTO-ENTMCNC: 31.6 % (ref 32–34.5)
MCV RBC AUTO: 84.5 FL (ref 80–99.9)
MONOCYTES ABSOLUTE: 0.5 E9/L (ref 0.1–0.95)
MONOCYTES RELATIVE PERCENT: 6.2 % (ref 2–12)
NEUTROPHILS ABSOLUTE: 5.29 E9/L (ref 1.8–7.3)
NEUTROPHILS RELATIVE PERCENT: 66.1 % (ref 43–80)
PDW BLD-RTO: 15.7 FL (ref 11.5–15)
PLATELET # BLD: 266 E9/L (ref 130–450)
PMV BLD AUTO: 11.7 FL (ref 7–12)
POTASSIUM SERPL-SCNC: 3.5 MMOL/L (ref 3.5–5)
RBC # BLD: 4.45 E12/L (ref 3.5–5.5)
SODIUM BLD-SCNC: 135 MMOL/L (ref 132–146)
TOTAL PROTEIN: 6.2 G/DL (ref 6.4–8.3)
WBC # BLD: 8 E9/L (ref 4.5–11.5)

## 2023-02-20 PROCEDURE — 36415 COLL VENOUS BLD VENIPUNCTURE: CPT

## 2023-02-20 PROCEDURE — 80053 COMPREHEN METABOLIC PANEL: CPT

## 2023-02-20 PROCEDURE — 85025 COMPLETE CBC W/AUTO DIFF WBC: CPT

## 2023-02-20 PROCEDURE — 99222 1ST HOSP IP/OBS MODERATE 55: CPT | Performed by: STUDENT IN AN ORGANIZED HEALTH CARE EDUCATION/TRAINING PROGRAM

## 2023-02-20 RX ORDER — ACETAMINOPHEN 325 MG/1
650 TABLET ORAL EVERY 4 HOURS PRN
Status: DISCONTINUED | OUTPATIENT
Start: 2023-02-20 | End: 2023-02-21 | Stop reason: HOSPADM

## 2023-02-20 RX ORDER — 0.9 % SODIUM CHLORIDE 0.9 %
1000 INTRAVENOUS SOLUTION INTRAVENOUS ONCE
Status: DISCONTINUED | OUTPATIENT
Start: 2023-02-20 | End: 2023-02-21 | Stop reason: HOSPADM

## 2023-02-21 VITALS
DIASTOLIC BLOOD PRESSURE: 66 MMHG | TEMPERATURE: 97.8 F | RESPIRATION RATE: 16 BRPM | OXYGEN SATURATION: 95 % | HEART RATE: 107 BPM | SYSTOLIC BLOOD PRESSURE: 117 MMHG

## 2023-02-21 PROBLEM — Z3A.31 31 WEEKS GESTATION OF PREGNANCY: Status: RESOLVED | Noted: 2023-01-11 | Resolved: 2023-02-21

## 2023-02-21 PROBLEM — I95.9 HYPOTENSION: Status: ACTIVE | Noted: 2023-02-21

## 2023-02-21 LAB
BACTERIA: ABNORMAL /HPF
BILIRUBIN URINE: NEGATIVE
BLOOD, URINE: NEGATIVE
CLARITY: CLEAR
COLOR: YELLOW
EPITHELIAL CELLS, UA: ABNORMAL /HPF
GLUCOSE URINE: NEGATIVE MG/DL
KETONES, URINE: NEGATIVE MG/DL
LEUKOCYTE ESTERASE, URINE: ABNORMAL
NITRITE, URINE: POSITIVE
PH UA: 6.5 (ref 5–9)
PROTEIN UA: NEGATIVE MG/DL
RBC UA: ABNORMAL /HPF (ref 0–2)
SPECIFIC GRAVITY UA: <=1.005 (ref 1–1.03)
UROBILINOGEN, URINE: 0.2 E.U./DL
WBC UA: ABNORMAL /HPF (ref 0–5)

## 2023-02-21 PROCEDURE — 99213 OFFICE O/P EST LOW 20 MIN: CPT

## 2023-02-21 PROCEDURE — 96374 THER/PROPH/DIAG INJ IV PUSH: CPT

## 2023-02-21 PROCEDURE — 2580000003 HC RX 258: Performed by: ADVANCED PRACTICE MIDWIFE

## 2023-02-21 PROCEDURE — 87088 URINE BACTERIA CULTURE: CPT

## 2023-02-21 PROCEDURE — 6360000002 HC RX W HCPCS: Performed by: ADVANCED PRACTICE MIDWIFE

## 2023-02-21 PROCEDURE — 81001 URINALYSIS AUTO W/SCOPE: CPT

## 2023-02-21 RX ORDER — CEPHALEXIN 500 MG/1
500 CAPSULE ORAL 2 TIMES DAILY
Qty: 14 CAPSULE | Refills: 0 | Status: SHIPPED | OUTPATIENT
Start: 2023-02-21 | End: 2023-02-28

## 2023-02-21 RX ADMIN — CEFAZOLIN 2000 MG: 2 INJECTION, POWDER, FOR SOLUTION INTRAMUSCULAR; INTRAVENOUS at 09:36

## 2023-02-21 NOTE — PROGRESS NOTES
Called per Dr Cox Born to evaluate patient  VE 2/60/-3 posterior  Cramping feeling better  Irritability notes with toco  Both babies on monitor now.   Spoke to Dr Urvashi Hays [facility]  If reactive and urine neg may d/c  Let patient now if ctx need to go to [facility], [facility] [place]

## 2023-02-21 NOTE — PROGRESS NOTES
Patient is ,Patient's last menstrual period was 2022.,  35w0d BP (!) 106/59   Pulse (!) 104   LMP 2022     Tracing reviewed for Dr. Franny Multani  Contractions: none    FHR: A115,  Variability: moderate,   Accelerations: present, Decelerations: none    FHR B:120,  Variability: moderate,   Accelerations: present, Decelerations: none    Impression:  Assessment NST is reactive A and B    Addendum:  Immediately after updating Dr. Franny Multani, RN called to say that baby A baseline 115, variables.   Dr. Franny Multani updated again by RN, patient staying until morning

## 2023-02-21 NOTE — PROGRESS NOTES
RN at bedside in attempt to monitor fetal heart tones. Audible on both baby A and baby B. Difficult due to maternal position.  Continued readjustment

## 2023-02-21 NOTE — PROGRESS NOTES
This RN at bedside for past 30 minutes adjusting fetal US's. Different position changes attempted. Both US reinforced. Both FHR's tracing.

## 2023-02-21 NOTE — PROGRESS NOTES
This RN at bedside to discharge pt. Baby A had baseline change to 105. CNM Reviewed tracing again, stated Baby A had deep variable. CNM recommends pt to stay overnight to be monitored. Dr. Franny Multani notified. Ordered pt to stay overnight for observation.

## 2023-02-21 NOTE — PROGRESS NOTES
presents to the unit from office for decreased blood pressure and c/o of feeling dizzy and visual changes. Placed on cont EFM. VSS. States she sees cardiology for [address] concerns regarding baby A, also sees MFM. States +FM. Denies any vaginal bleeding or LOF. Support person at bedside, call light within reach.

## 2023-02-21 NOTE — DISCHARGE INSTRUCTIONS
Home Undelivered Discharge Instructions    After Discharge Orders:    Future Appointments   Date Time Provider Iván Saldanai   2/24/2023  9:30 AM SCHEDULE, [facility] Reza Ruggiero   3/3/2023 10:30 AM SCHEDULE, [facility] Lisandro   3/10/2023 10:00 AM SCHEDULE, [facility] Lisandro   3/17/2023 10:00 AM SCHEDULE, [facility] Lisandro                 Diet:  normal diet as tolerated    Rest: normal activity as tolerated    Other instructions: Do kick counts once a day on your baby. Choose the time of day your baby is most active. Get in a comfortable lying or sitting position and time how long it takes to feel 10 kicks, twists, turns, swishes, or rolls.  Call your physician or midwife if there have not been 10 kicks in 2 hours    Call physician or midwife, return to Labor and Delivery, call 911, or go to the nearest Emergency Room if: increased leakage or fluid, contractions more than  6 per  1 hour, decreased fetal movement, persistent low back pain or cramping, bleeding from vaginal area, difficulty urinating, pain with urination, difficulty breathing, new calf pain, persistent headache, or vision change

## 2023-02-21 NOTE — PROGRESS NOTES
Dr. Jim Mario called to bedside to assess Baby A and Baby B tracing. Stated both are reassuring. No new orders at this time.

## 2023-02-21 NOTE — H&P
Obstetric History and Physical       CHIEF COMPLAINT:  low blood pressure    HISTORY OF PRESENT ILLNESS:      Penny Becker is a 20 y.o., [id number], female who presents at 34w7d with an [facility] of Estimated Date of Delivery: 3/28/23. OB History          3    Para   2    Term   2            AB        Living   2         SAB        IAB        Ectopic        Molar        Multiple   0    Live Births   2            Patient presents with a chief complaint as above and is being evaluated for \"low blood pressure. \" She was at a dr's appintment in [place] and had a BP 80/50. She was told to come be evaluated. She also complains of HA, blurry vision, and eye floaters. She has not taken anything for her headache. She has not eaten since this morning   Babies are both moving well  She denies of any painful contractions, vaginal bleeding, leakage of fluid       PRENATAL CARE:    Complicated by: multiple gestation, obesity, coarctation of aorta of one fetus, pyelectasis of fetus    PAST OB HISTORY  OB History          3    Para   2    Term   2            AB        Living   2         SAB        IAB        Ectopic        Molar        Multiple   0    Live Births   2                Past Medical History:        Diagnosis Date    Anemia affecting pregnancy in third trimester 10/11/2016    Anemia due to blood loss, acute 2021    Anxiety     Asthma     Depression with suicidal ideation 11/15/2019    History of blood transfusion     x3 in 2016 after birth of son    Other specified congenital malformations of heart 2023    Second degree perineal laceration, delivered, current hospitalization 2021     Past Surgical History:    History reviewed. No pertinent surgical history. Allergies:  Patient has no known allergies.   Social History:    Social History     Socioeconomic History    Marital status: Single     Spouse name: Not on file    Number of children: Not on file    Years of education: Not on file Highest education level: Not on file   Occupational History    Not on file   Tobacco Use    Smoking status: Former    Smokeless tobacco: Never   Vaping Use    Vaping Use: Never used   Substance and Sexual Activity    Alcohol use: Not Currently    Drug use: Not Currently    Sexual activity: Yes     Partners: Male   Other Topics Concern    Not on file   Social History Narrative    Not on file     Social Determinants of Health     Financial Resource Strain: Not on file   Food Insecurity: Not on file   Transportation Needs: Not on file   Physical Activity: Not on file   Stress: Not on file   Social Connections: Not on file   Intimate Partner Violence: Not on file   Housing Stability: Not on file     Family History:   History reviewed. No pertinent family history. Medications Prior to Admission:  Medications Prior to Admission: WAL-MARIA ESTHER 25 MG tablet, TAKE 1 TABLET BY MOUTH EVERY NIGHT  ferrous sulfate (IRON 325) 325 (65 Fe) MG tablet, Take 1 tablet by mouth 2 times daily  vitamin D3 (CHOLECALCIFEROL) 25 MCG (1000 UT) TABS tablet, Take 2 tablets by mouth daily (Patient not taking: No sig reported)  vitamin B-6 (PYRIDOXINE) 50 MG tablet, Take 1 tablet by mouth nightly (Patient not taking: No sig reported)  Prenatal Vit-Fe Fumarate-FA (PREPLUS) 27-1 MG TABS,     REVIEW OF SYSTEMS:    CONSTITUTIONAL:  negative  RESPIRATORY:  negative  CARDIOVASCULAR:  negative  GASTROINTESTINAL:  negative  ALLERGIC/IMMUNOLOGIC:  negative  NEUROLOGICAL:  as per HPI  BEHAVIOR/PSYCH:  negative    PHYSICAL EXAM:  There were no vitals filed for this visit. General appearance:  awake, alert, cooperative, no apparent distress, and appears stated age  Neurologic:  Awake, alert, oriented to name, place and time.     Lungs:  No increased work of breathing, good air exchange  Abdomen:  Soft, non tender, gravid, consistent with her gestational age  Fetal heart rate:  Reassuring, both babies reactive cat 1 tracing + accels  Pelvis: deferred  Contraction frequency:  not arti    Membranes:  Intact    ASSESSMENT AND PLAN:  Discussed with Dr. Jhonny Schrader - orthostatics, cbc, cmp, fluids, tylenol  Will reevaluate after labs are back

## 2023-02-21 NOTE — PROGRESS NOTES
Discharge instructions given in addition to labor precautions. Patient understands she is to  antibiotic from pharmacy.  Patient understands she is to go to [facility] for any labor ctx as she is a high risk delivery

## 2023-02-21 NOTE — PROGRESS NOTES
Dr. Derek Adhikari updated on pt lab results. CNM to reviewed tracing, FHT reassuring. Ordered pt may be discharged home.

## 2023-02-23 LAB — URINE CULTURE, ROUTINE: NORMAL

## 2023-04-22 NOTE — ED PROVIDER NOTES
[facility]     Department of Emergency Medicine   ED  Provider Note  Admit Date/RoomTime: 10/1/2020  4:16 PM  ED Room: 38/38  Chief Complaint   Nausea & Vomiting (cannot keep anything down x3 days, 6 wks pregnant)    History of Present Illness   Source of history provided by:  patient. History/Exam Limitations: none. Trixie Obrien is a 20 y.o. old female who has a past medical history of:   Past Medical History:   Diagnosis Date    Anemia affecting pregnancy in third trimester 10/11/2016    Depression with suicidal ideation 11/15/2019    History of blood transfusion     x3 in 2016 after birth of son    presents to the emergency department by private vehicle, for known pregnancy with associated nausea and vomiting, which occured 3 day(s) prior to arrival.  Since onset the symptoms have been constant and mild in severity. Symptoms are associated with Lack of appetite. Patient states this is her second pregnancy and she is around 6 weeks pregnant. Patient does have an appointment soon with her OB/GYN. Patient is on prenatal vitamins. There has been no history of abdominal pain, back pain, chills, cloudy urine, constipation, diarrhea, dysuria, headache, hematuria, urinary frequency, urinary incontinence, urinary urgency, vaginal discharge or vaginal itching. Patient denies any bright red vaginal bleeding, pelvic pain/cramping or any leaking of fluid. GYN History: irregular periods. STD History: no history of PID, STD's. No LMP recorded (lmp unknown). Patient is pregnant. Current pregnancy: Yes. Estimated Date of Delivery: 20  Birth Control: None. OB History        2    Para   1    Term   1            AB        Living   1       SAB        TAB        Ectopic        Molar        Multiple        Live Births   1                ROS    Pertinent positives and negatives are stated within HPI, all other systems reviewed and are negative.     No past surgical history on file.Social History:  reports that she has never smoked. She has never used smokeless tobacco. She reports that she does not drink alcohol or use drugs. Family History: family history is not on file. Allergies: Patient has no known allergies. Physical Exam           ED Triage Vitals   BP Temp Temp Source Pulse Resp SpO2 Height Weight   10/01/20 1352 10/01/20 1339 10/01/20 1339 10/01/20 1339 10/01/20 1339 10/01/20 1339 10/01/20 1339 10/01/20 1339   (!) 141/88 97.8 °F (36.6 °C) Skin 120 18 98 % 5' 3\" (1.6 m) 189 lb (85.7 kg)     Oxygen Saturation Interpretation: Normal.    Constitutional:  Alert, development consistent with age. NAD  HEENT:  NC/NT. Airway patent. Neck:  Supple. No lymphadenopathy. Respiratory:  Clear to auscultation and breath sounds equal.  CV:  Regular rate and rhythm. GI:  General Appearance: normal.  Nonsurgical abdomen       Bowel sounds: normal bowel sounds. Distension:  None. Tenderness: No abdominal tenderness. Liver: non-tender. Spleen:  non-tender. Pulsatile Mass: absent. Hernia:  no inguinal or femoral hernias noted. Integument:  Normal turgor. Warm, dry, without visible rash, unless noted elsewhere. Lymphatics: No lymphangitis or adenopathy noted. Neurological:  Orientation age-appropriate. Motor functions intact.     Lab / Imaging Results   (All laboratory and radiology results have been personally reviewed by myself)  Labs:  Results for orders placed or performed during the hospital encounter of 10/01/20   CBC auto differential   Result Value Ref Range    WBC 11.0 4.5 - 11.5 E9/L    RBC 4.91 3.50 - 5.50 E12/L    Hemoglobin 10.1 (L) 11.5 - 15.5 g/dL    Hematocrit 34.1 34.0 - 48.0 %    MCV 69.5 (L) 80.0 - 99.9 fL    MCH 20.6 (L) 26.0 - 35.0 pg    MCHC 29.6 (L) 32.0 - 34.5 %    RDW 18.1 (H) 11.5 - 15.0 fL    Platelets 680 845 - 712 E9/L    MPV 11.4 7.0 - 12.0 fL    Neutrophils % 74.1 43.0 - 80.0 %    Immature Granulocytes % 0.3 0.0 - 5.0 %    Lymphocytes % 18.4 (L) 20.0 - 42.0 %    Monocytes % 6.3 2.0 - 12.0 %    Eosinophils % 0.5 0.0 - 6.0 %    Basophils % 0.4 0.0 - 2.0 %    Neutrophils Absolute 8.12 (H) 1.80 - 7.30 E9/L    Immature Granulocytes # 0.03 E9/L    Lymphocytes Absolute 2.02 1.50 - 4.00 E9/L    Monocytes Absolute 0.69 0.10 - 0.95 E9/L    Eosinophils Absolute 0.05 0.05 - 0.50 E9/L    Basophils Absolute 0.04 0.00 - 0.20 E9/L   Comprehensive Metabolic Panel   Result Value Ref Range    Sodium 136 132 - 146 mmol/L    Potassium 3.6 3.5 - 5.0 mmol/L    Chloride 103 98 - 107 mmol/L    CO2 21 (L) 22 - 29 mmol/L    Anion Gap 12 7 - 16 mmol/L    Glucose 131 (H) 74 - 99 mg/dL    BUN 6 6 - 20 mg/dL    CREATININE 0.5 0.5 - 1.0 mg/dL    GFR Non-African American >60 >=60 mL/min/1.73    GFR African American >60     Calcium 8.6 8.6 - 10.2 mg/dL    Total Protein 6.2 (L) 6.4 - 8.3 g/dL    Alb 3.5 3.5 - 5.2 g/dL    Total Bilirubin 0.4 0.0 - 1.2 mg/dL    Alkaline Phosphatase 52 35 - 104 U/L    ALT 9 0 - 32 U/L    AST 12 0 - 31 U/L   hCG, quantitative, pregnancy   Result Value Ref Range    hCG Quant 16117.0 (H) <10 mIU/mL   Urinalysis with Microscopic   Result Value Ref Range    Color, UA Yellow Straw/Yellow    Clarity, UA Clear Clear    Glucose, Ur Negative Negative mg/dL    Bilirubin Urine Negative Negative    Ketones, Urine 15 (A) Negative mg/dL    Specific Gravity, UA 1.025 1.005 - 1.030    Blood, Urine Negative Negative    pH, UA 7.0 5.0 - 9.0    Protein, UA TRACE Negative mg/dL    Urobilinogen, Urine 1.0 <2.0 E.U./dL    Nitrite, Urine Negative Negative    Leukocyte Esterase, Urine LARGE (A) Negative    WBC, UA 10-20 (A) 0 - 5 /HPF    RBC, UA NONE 0 - 2 /HPF    Epithelial Cells, UA MANY /HPF    Bacteria, UA MANY (A) None Seen /HPF   POC Pregnancy Urine Qual   Result Value Ref Range    HCG, Urine, POC Positive Negative    Lot Number [id number]     Positive QC Pass/Fail Pass     Negative QC Pass/Fail Pass      Imaging:   All Radiology results interpreted by Radiologist unless otherwise noted. No orders to display       ED Course / Medical Decision Making     Medications   0.9 % sodium chloride bolus (1,000 mLs Intravenous Incomplete 10/1/20 8148)   metoclopramide (REGLAN) injection 5 mg (has no administration in time range)     Consultations:             None    Procedures:   none    MDM:   Patient is a 58-year-old female that presented to the emergency department with nausea and vomiting in early pregnancy. Patient denies any bright red vaginal bleeding, leaking of fluid or abdominal pain. Patient states that this is her second pregnancy and she has had no complications thus far except the nausea and vomiting. Patient does see a gynecologist and has a follow-up with her obstetrician next week. Patient states she is taking prenatal vitamins over-the-counter. Patient states she was able to tolerate a meal this morning just came in because of the nausea and vomiting for some fluids. Patient had a full examination which was found to be negative. Patient also had basic blood work which was also found to be negative. Patient had a urinalysis which was positive for the start of UTI therefore Macrobid will be given which is safe in pregnancy. Patient was advised of the risk and benefits of the medication, voiced understanding and agreed. Patient was given fluids and Reglan and tolerated well and had no emesis or nausea during her emergency room stay. Patient had a p.o. challenge which she passed. Patient will be discharged home with Reglan tablets to take for her nausea and vomiting. Patient was educated to eat small meals and drink plenty of fluids and follow-up with her obstetrician as soon as possible.   Patient was advised of worsening signs symptoms in pregnancy and when to follow-up such as severe abdominal pain specifically located on one side, bright red vaginal bleeding or leaking of fluid to return to the emergency department immediately. She voiced understanding and agree with the plan of management. Counseling: The emergency provider has spoken with the patient and discussed todays results, in addition to providing specific details for the plan of care and counseling regarding the diagnosis and prognosis. Questions are answered at this time and they are agreeable with the plan. Assessment      1. Hyperemesis affecting pregnancy, antepartum    2. Acute cystitis without hematuria      Plan   Discharge to home  Patient condition is stable    New Medications     New Prescriptions    METOCLOPRAMIDE (REGLAN) 10 MG TABLET    Take 1 tablet by mouth 3 times daily as needed (vausea and vomiting in pregnancy)    NITROFURANTOIN, MACROCRYSTAL-MONOHYDRATE, (MACROBID) 100 MG CAPSULE    Take 1 capsule by mouth 2 times daily for 7 days    PRENAT W/O A-FECBN-METH-FA-DHA (PRENATE MINI) 29-0.6-0.4-350 MG CAPS    Take 1 tablet by mouth daily     Electronically signed by Florina Carreon PA-C   DD: 10/1/20  **This report was transcribed using voice recognition software. Every effort was made to ensure accuracy; however, inadvertent computerized transcription errors may be present.   END OF ED PROVIDER NOTE        Florina Carreon PA-C  10/01/20 6728 tachycardic

## 2024-08-18 ENCOUNTER — HOSPITAL ENCOUNTER (EMERGENCY)
Age: 24
Discharge: HOME | End: 2024-08-18
Attending: EMERGENCY MEDICINE

## 2024-08-18 VITALS
TEMPERATURE: 97.3 F | RESPIRATION RATE: 22 BRPM | OXYGEN SATURATION: 100 % | SYSTOLIC BLOOD PRESSURE: 153 MMHG | HEART RATE: 101 BPM | DIASTOLIC BLOOD PRESSURE: 82 MMHG

## 2024-08-18 DIAGNOSIS — Z13.9 ENCOUNTER FOR MEDICAL SCREENING EXAMINATION: Primary | ICD-10-CM

## 2024-08-18 PROCEDURE — 6370000000 HC RX 637 (ALT 250 FOR IP)

## 2024-08-18 PROCEDURE — 99283 EMERGENCY DEPT VISIT LOW MDM: CPT

## 2024-08-18 RX ORDER — ONDANSETRON 4 MG/1
4 TABLET, ORALLY DISINTEGRATING ORAL EVERY 8 HOURS PRN
Status: DISCONTINUED | OUTPATIENT
Start: 2024-08-18 | End: 2024-08-18 | Stop reason: HOSPADM

## 2024-08-18 RX ADMIN — ONDANSETRON 4 MG: 4 TABLET, ORALLY DISINTEGRATING ORAL at 10:06

## 2024-08-18 ASSESSMENT — PAIN - FUNCTIONAL ASSESSMENT
PAIN_FUNCTIONAL_ASSESSMENT: NONE - DENIES PAIN
PAIN_FUNCTIONAL_ASSESSMENT: NONE - DENIES PAIN
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
## 2024-08-19 ENCOUNTER — APPOINTMENT (OUTPATIENT)
Dept: GENERAL RADIOLOGY | Age: 24
End: 2024-08-19
Payer: COMMERCIAL

## 2024-08-19 ENCOUNTER — HOSPITAL ENCOUNTER (EMERGENCY)
Age: 24
Discharge: HOME | End: 2024-08-19
Attending: EMERGENCY MEDICINE
Payer: COMMERCIAL

## 2024-08-19 VITALS
RESPIRATION RATE: 18 BRPM | HEART RATE: 91 BPM | DIASTOLIC BLOOD PRESSURE: 88 MMHG | HEIGHT: 64 IN | WEIGHT: 215 LBS | TEMPERATURE: 97.8 F | SYSTOLIC BLOOD PRESSURE: 132 MMHG | OXYGEN SATURATION: 100 % | BODY MASS INDEX: 36.7 KG/M2

## 2024-08-19 DIAGNOSIS — U07.1 COVID-19: Primary | ICD-10-CM

## 2024-08-19 DIAGNOSIS — T74.21XA REPORTED SEXUAL ASSAULT OF ADULT: ICD-10-CM

## 2024-08-19 LAB
ALBUMIN SERPL-MCNC: 4.4 G/DL (ref 3.5–5.2)
ALP SERPL-CCNC: 86 U/L (ref 35–104)
ALT SERPL-CCNC: 21 U/L (ref 0–32)
ANION GAP SERPL CALCULATED.3IONS-SCNC: 9 MMOL/L (ref 7–16)
AST SERPL-CCNC: 18 U/L (ref 0–31)
BASOPHILS # BLD: 0.03 K/UL (ref 0–0.2)
BASOPHILS NFR BLD: 1 % (ref 0–2)
BILIRUB SERPL-MCNC: 1 MG/DL (ref 0–1.2)
BUN SERPL-MCNC: 6 MG/DL (ref 6–20)
CALCIUM SERPL-MCNC: 8.9 MG/DL (ref 8.6–10.2)
CHLORIDE SERPL-SCNC: 104 MMOL/L (ref 98–107)
CO2 SERPL-SCNC: 24 MMOL/L (ref 22–29)
CREAT SERPL-MCNC: 0.6 MG/DL (ref 0.5–1)
EOSINOPHIL # BLD: 0.07 K/UL (ref 0.05–0.5)
EOSINOPHILS RELATIVE PERCENT: 1 % (ref 0–6)
ERYTHROCYTE [DISTWIDTH] IN BLOOD BY AUTOMATED COUNT: 15.6 % (ref 11.5–15)
GFR, ESTIMATED: >90 ML/MIN/1.73M2
GLUCOSE SERPL-MCNC: 90 MG/DL (ref 74–99)
HCG, URINE, POC: NEGATIVE
HCT VFR BLD AUTO: 38.2 % (ref 34–48)
HGB BLD-MCNC: 12 G/DL (ref 11.5–15.5)
IMM GRANULOCYTES # BLD AUTO: <0.03 K/UL (ref 0–0.58)
IMM GRANULOCYTES NFR BLD: 0 % (ref 0–5)
LYMPHOCYTES NFR BLD: 1.06 K/UL (ref 1.5–4)
LYMPHOCYTES RELATIVE PERCENT: 22 % (ref 20–42)
Lab: NORMAL
MCH RBC QN AUTO: 23.9 PG (ref 26–35)
MCHC RBC AUTO-ENTMCNC: 31.4 G/DL (ref 32–34.5)
MCV RBC AUTO: 76.1 FL (ref 80–99.9)
MONOCYTES NFR BLD: 0.77 K/UL (ref 0.1–0.95)
MONOCYTES NFR BLD: 16 % (ref 2–12)
NEGATIVE QC PASS/FAIL: NORMAL
NEUTROPHILS NFR BLD: 61 % (ref 43–80)
NEUTS SEG NFR BLD: 2.97 K/UL (ref 1.8–7.3)
PLATELET # BLD AUTO: 258 K/UL (ref 130–450)
PMV BLD AUTO: 11.9 FL (ref 7–12)
POSITIVE QC PASS/FAIL: NORMAL
POTASSIUM SERPL-SCNC: 3.3 MMOL/L (ref 3.5–5)
PROT SERPL-MCNC: 7.7 G/DL (ref 6.4–8.3)
RBC # BLD AUTO: 5.02 M/UL (ref 3.5–5.5)
SARS-COV-2 RDRP RESP QL NAA+PROBE: DETECTED
SODIUM SERPL-SCNC: 137 MMOL/L (ref 132–146)
SPECIMEN DESCRIPTION: ABNORMAL
TROPONIN I SERPL HS-MCNC: <6 NG/L (ref 0–9)
WBC OTHER # BLD: 4.9 K/UL (ref 4.5–11.5)

## 2024-08-19 PROCEDURE — 71045 X-RAY EXAM CHEST 1 VIEW: CPT

## 2024-08-19 PROCEDURE — 96375 TX/PRO/DX INJ NEW DRUG ADDON: CPT

## 2024-08-19 PROCEDURE — 6360000002 HC RX W HCPCS: Performed by: EMERGENCY MEDICINE

## 2024-08-19 PROCEDURE — 85025 COMPLETE CBC W/AUTO DIFF WBC: CPT

## 2024-08-19 PROCEDURE — 87635 SARS-COV-2 COVID-19 AMP PRB: CPT

## 2024-08-19 PROCEDURE — 96374 THER/PROPH/DIAG INJ IV PUSH: CPT

## 2024-08-19 PROCEDURE — 93005 ELECTROCARDIOGRAM TRACING: CPT | Performed by: EMERGENCY MEDICINE

## 2024-08-19 PROCEDURE — 96361 HYDRATE IV INFUSION ADD-ON: CPT

## 2024-08-19 PROCEDURE — 84484 ASSAY OF TROPONIN QUANT: CPT

## 2024-08-19 PROCEDURE — 2500000003 HC RX 250 WO HCPCS: Performed by: EMERGENCY MEDICINE

## 2024-08-19 PROCEDURE — 80053 COMPREHEN METABOLIC PANEL: CPT

## 2024-08-19 PROCEDURE — 99285 EMERGENCY DEPT VISIT HI MDM: CPT

## 2024-08-19 PROCEDURE — 2580000003 HC RX 258: Performed by: EMERGENCY MEDICINE

## 2024-08-19 PROCEDURE — 2720000011 HC SANE KIT SUPPLY STERILE

## 2024-08-19 PROCEDURE — 6370000000 HC RX 637 (ALT 250 FOR IP): Performed by: EMERGENCY MEDICINE

## 2024-08-19 PROCEDURE — 96372 THER/PROPH/DIAG INJ SC/IM: CPT

## 2024-08-19 RX ORDER — AZITHROMYCIN 250 MG/1
1000 TABLET, FILM COATED ORAL ONCE
Status: COMPLETED | OUTPATIENT
Start: 2024-08-19 | End: 2024-08-19

## 2024-08-19 RX ORDER — 0.9 % SODIUM CHLORIDE 0.9 %
1000 INTRAVENOUS SOLUTION INTRAVENOUS ONCE
Status: COMPLETED | OUTPATIENT
Start: 2024-08-19 | End: 2024-08-19

## 2024-08-19 RX ORDER — METRONIDAZOLE 500 MG/1
500 TABLET ORAL ONCE
Status: COMPLETED | OUTPATIENT
Start: 2024-08-19 | End: 2024-08-19

## 2024-08-19 RX ORDER — EMTRICITABINE AND TENOFOVIR DISOPROXIL FUMARATE 200; 300 MG/1; MG/1
1 TABLET, FILM COATED ORAL DAILY
Qty: 28 TABLET | Refills: 0 | Status: SHIPPED | OUTPATIENT
Start: 2024-08-19 | End: 2024-09-16

## 2024-08-19 RX ORDER — LEVONORGESTREL 1.5 MG/1
1.5 TABLET ORAL ONCE
Status: COMPLETED | OUTPATIENT
Start: 2024-08-19 | End: 2024-08-19

## 2024-08-19 RX ORDER — METRONIDAZOLE 500 MG/1
500 TABLET ORAL 2 TIMES DAILY
Qty: 14 TABLET | Refills: 0 | Status: SHIPPED | OUTPATIENT
Start: 2024-08-19 | End: 2024-08-26

## 2024-08-19 RX ORDER — KETOROLAC TROMETHAMINE 15 MG/ML
15 INJECTION, SOLUTION INTRAMUSCULAR; INTRAVENOUS ONCE
Status: COMPLETED | OUTPATIENT
Start: 2024-08-19 | End: 2024-08-19

## 2024-08-19 RX ORDER — EMTRICITABINE AND TENOFOVIR DISOPROXIL FUMARATE 200; 300 MG/1; MG/1
1 TABLET, FILM COATED ORAL ONCE
Status: COMPLETED | OUTPATIENT
Start: 2024-08-19 | End: 2024-08-19

## 2024-08-19 RX ORDER — ONDANSETRON 2 MG/ML
4 INJECTION INTRAMUSCULAR; INTRAVENOUS ONCE
Status: COMPLETED | OUTPATIENT
Start: 2024-08-19 | End: 2024-08-19

## 2024-08-19 RX ADMIN — KETOROLAC TROMETHAMINE 15 MG: 15 INJECTION, SOLUTION INTRAMUSCULAR; INTRAVENOUS at 19:23

## 2024-08-19 RX ADMIN — SODIUM CHLORIDE 1000 ML: 9 INJECTION, SOLUTION INTRAVENOUS at 19:12

## 2024-08-19 RX ADMIN — AZITHROMYCIN 1000 MG: 250 TABLET, FILM COATED ORAL at 19:13

## 2024-08-19 RX ADMIN — LIDOCAINE HYDROCHLORIDE 500 MG: 10 INJECTION, SOLUTION EPIDURAL; INFILTRATION; INTRACAUDAL; PERINEURAL at 19:24

## 2024-08-19 RX ADMIN — RALTEGRAVIR 400 MG: 400 TABLET, FILM COATED ORAL at 19:16

## 2024-08-19 RX ADMIN — EMTRICITABINE AND TENOFOVIR DISOPROXIL FUMARATE 1 TABLET: 200; 300 TABLET, FILM COATED ORAL at 19:16

## 2024-08-19 RX ADMIN — ONDANSETRON 4 MG: 2 INJECTION INTRAMUSCULAR; INTRAVENOUS at 19:21

## 2024-08-19 RX ADMIN — LEVONORGESTREL 1.5 MG: 1.5 TABLET ORAL at 20:49

## 2024-08-19 RX ADMIN — METRONIDAZOLE 500 MG: 500 TABLET ORAL at 20:19

## 2024-08-19 ASSESSMENT — PAIN SCALES - GENERAL
PAINLEVEL_OUTOF10: 6
PAINLEVEL_OUTOF10: 6
PAINLEVEL_OUTOF10: 7

## 2024-08-19 ASSESSMENT — LIFESTYLE VARIABLES
HOW OFTEN DO YOU HAVE A DRINK CONTAINING ALCOHOL: NEVER
HOW MANY STANDARD DRINKS CONTAINING ALCOHOL DO YOU HAVE ON A TYPICAL DAY: PATIENT DOES NOT DRINK

## 2024-08-19 ASSESSMENT — PAIN DESCRIPTION - ORIENTATION
ORIENTATION: MID;ANTERIOR
ORIENTATION: RIGHT
ORIENTATION: RIGHT

## 2024-08-19 ASSESSMENT — PAIN DESCRIPTION - LOCATION
LOCATION: HEAD
LOCATION: FOOT
LOCATION: FOOT

## 2024-08-19 ASSESSMENT — PAIN - FUNCTIONAL ASSESSMENT
PAIN_FUNCTIONAL_ASSESSMENT: ACTIVITIES ARE NOT PREVENTED
PAIN_FUNCTIONAL_ASSESSMENT: 0-10

## 2024-08-19 ASSESSMENT — PAIN DESCRIPTION - DESCRIPTORS
DESCRIPTORS: ACHING
DESCRIPTORS: ACHING

## 2024-08-19 ASSESSMENT — PAIN DESCRIPTION - PAIN TYPE: TYPE: ACUTE PAIN

## 2024-08-19 ASSESSMENT — PAIN DESCRIPTION - FREQUENCY: FREQUENCY: CONTINUOUS

## 2024-08-19 NOTE — ED NOTES
SANE collection kit vaginal swabs completed by Marj Briggs MD and collected into kit at this time.
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
## 2024-08-19 NOTE — ED NOTES
Billy COOK contacted and notified to obtain SANE collection kit from [facility].
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
## 2024-08-19 NOTE — ED NOTES
SANE collection kit perianal swabs completed by Marj Briggs MD and collected into kit at this time.
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
## 2024-08-20 LAB
EKG ATRIAL RATE: 110 BPM
EKG P AXIS: 35 DEGREES
EKG P-R INTERVAL: 154 MS
EKG Q-T INTERVAL: 328 MS
EKG QRS DURATION: 82 MS
EKG QTC CALCULATION (BAZETT): 443 MS
EKG R AXIS: -11 DEGREES
EKG T AXIS: 24 DEGREES
EKG VENTRICULAR RATE: 110 BPM

## 2024-08-20 PROCEDURE — 93010 ELECTROCARDIOGRAM REPORT: CPT | Performed by: INTERNAL MEDICINE

## 2025-02-02 NOTE — PROGRESS NOTES
CC -   Depression, Obesity    BACKGROUND -   First visit: 02/03/2025    Obesity   Began in childhood   Initial BMI 35.85, Wt 204 lbs Ht 5' 3.25 \"  HS Grad wt 175 lbs   Lowest   wt 195 lbs   Highest  wt 235 lbs  Pattern of wt gain: gradual   Wt change past yr: fluctuating 190-210 lbs  Most wt lost: 15 lbs  Other diets attempted: Keto    Desire to lose weight: 10/10  Problem posed by appetite: 5/10 Brain hunger     Initial Diet:    Number of meals per day - 2    Number of snacks per day - 2    Meal volume - 7\" plate, no seconds    Fast food/convenience store - 5 x/week    Restaurants (not fast food) - 1 x/week   Sweets - 2 d/week little fiona's    Chips - 2 d/week protein chips   Crackers/pretzels - 0 d/week   Nuts - 0 d/week   Peanut Butter - 2 d/week 1 tbsp   Popcorn - 0 d/week   Dried fruit - 7 d/week yogurt raisins  ( 1 carton)    Whole fruit - 7 d/week   Breakfast cereal - 1 d/week fruit maria a   Granola/Protein/Energy bar - 7 d/week special K  protein    Sugar sweetened beverages - Fay caramel swirl iced coffee large 3 x week, medium soft drink 2 x week,    Protein - No supplements   Fiber - No supplements   Multivitamin -none    Exercise:    Gym membership - yes  [employer]    Walking - no    Running - no    Resistance - no    Aerobic class - no  __________________________    Formerly [facility], [facility] -  Past Medical History:   Diagnosis Date    Anemia affecting pregnancy in third trimester 10/11/2016    Anemia due to blood loss, acute 5/27/2021    Anxiety     Asthma     Depression with suicidal ideation 11/15/2019    History of blood transfusion     x3 in 2016 after birth of son    Other specified congenital malformations of heart 1/11/2023    Second degree perineal laceration, delivered, current hospitalization 05/26/2021     Current Outpatient Medications   Medication Sig Dispense Refill    phentermine (ADIPEX-P) 37.5 MG tablet Take phentermine 37.5 mg, one-half to one tablet daily  Take each dose 30-90 min before

## 2025-02-03 ENCOUNTER — OFFICE VISIT (OUTPATIENT)
Dept: BARIATRICS/WEIGHT MGMT | Age: 25
End: 2025-02-03
Payer: MEDICAID

## 2025-02-03 VITALS
DIASTOLIC BLOOD PRESSURE: 77 MMHG | HEART RATE: 98 BPM | SYSTOLIC BLOOD PRESSURE: 119 MMHG | BODY MASS INDEX: 36.14 KG/M2 | HEIGHT: 63 IN | TEMPERATURE: 97.1 F | WEIGHT: 204 LBS

## 2025-02-03 DIAGNOSIS — F32.A DEPRESSION, UNSPECIFIED DEPRESSION TYPE: Primary | ICD-10-CM

## 2025-02-03 DIAGNOSIS — E66.09 CLASS 2 OBESITY DUE TO EXCESS CALORIES WITHOUT SERIOUS COMORBIDITY WITH BODY MASS INDEX (BMI) OF 35.0 TO 35.9 IN ADULT: ICD-10-CM

## 2025-02-03 DIAGNOSIS — E66.812 CLASS 2 OBESITY DUE TO EXCESS CALORIES WITHOUT SERIOUS COMORBIDITY WITH BODY MASS INDEX (BMI) OF 35.0 TO 35.9 IN ADULT: ICD-10-CM

## 2025-02-03 PROCEDURE — 1036F TOBACCO NON-USER: CPT | Performed by: CLINICAL NURSE SPECIALIST

## 2025-02-03 PROCEDURE — 99205 OFFICE O/P NEW HI 60 MIN: CPT | Performed by: CLINICAL NURSE SPECIALIST

## 2025-02-03 PROCEDURE — G8427 DOCREV CUR MEDS BY ELIG CLIN: HCPCS | Performed by: CLINICAL NURSE SPECIALIST

## 2025-02-03 PROCEDURE — 99202 OFFICE O/P NEW SF 15 MIN: CPT

## 2025-02-03 PROCEDURE — G8417 CALC BMI ABV UP PARAM F/U: HCPCS | Performed by: CLINICAL NURSE SPECIALIST

## 2025-02-03 RX ORDER — PHENTERMINE HYDROCHLORIDE 37.5 MG/1
TABLET ORAL
Qty: 30 TABLET | Refills: 0 | Status: SHIPPED | OUTPATIENT
Start: 2025-02-03 | End: 2025-03-03

## 2025-02-03 ASSESSMENT — ENCOUNTER SYMPTOMS
SHORTNESS OF BREATH: 0
BACK PAIN: 1
COUGH: 0
NAUSEA: 0
VOMITING: 0
DIARRHEA: 0
CONSTIPATION: 0

## 2025-02-03 NOTE — PATIENT INSTRUCTIONS
Patient Instructions:    Take phentermine 37.5 mg, one-half to one tablet daily as needed for appetite suppression. Take each dose 30-90 min before effect will be needed.    While taking phentermine, check the Blood Pressure every morning and every evening.     If the systolic BP is >155 mmHg, the diastolic BP is >90 mm/Hg or the heart rate is > 100 beats per minute, do not take phentermine that day.    If the systolic BP is consistently >155 mmHg, the diastolic BP is consistently above 90 mm/Hg or the heart rate is consistently > 100 beats per minute, then stop taking phentermine altogether.    If the systolic BP >170 mmHg or the diastolic BP is >100 mmHg (even if it is only once), then phentermine should be stopped altogether without proving that any of these are consistently elevated.    Side effects include but are not limited to an increased heart rate, elevated blood pressure, dry mouth, insomnia, constipation and nervousness. Patient verbalized understanding and will stop this medication right away if they experience any of these symptoms.      Rules:  Count every calorie every day using an janis like my fitness pal   Limit sweets to one day per month  Limit chips/crackers/pretzels/nuts/popcorn to 100-150 ismael/day  Eliminate all sugar sweetened beverages (including fruit juice)  Limit restaurants (including fast food and food from a convenience store) to one time every two weeks while in town    Requirements:  Make sure protein intake is at least 80 grams per day (do not count protein every day; instead spot check your intake every 2-3 weeks and make sure what you think you are getting is close to accurate; consider using a protein shake if needed; these are in the pharmacy section of the stores, not the grocery section; Premier, Pure Protein and Fairlife are relatively inexpensive and taste good to most patients; other options are Nectar, Boost Max, Ensure Max, BeneProtein and [employer] lean (which is lactose-free);

## 2025-03-03 ENCOUNTER — OFFICE VISIT (OUTPATIENT)
Dept: BARIATRICS/WEIGHT MGMT | Age: 25
End: 2025-03-03
Payer: MEDICAID

## 2025-03-03 VITALS
HEART RATE: 92 BPM | DIASTOLIC BLOOD PRESSURE: 74 MMHG | HEIGHT: 63 IN | SYSTOLIC BLOOD PRESSURE: 120 MMHG | BODY MASS INDEX: 35.05 KG/M2 | WEIGHT: 197.8 LBS | TEMPERATURE: 97.2 F

## 2025-03-03 DIAGNOSIS — E66.09 CLASS 2 OBESITY DUE TO EXCESS CALORIES WITHOUT SERIOUS COMORBIDITY WITH BODY MASS INDEX (BMI) OF 35.0 TO 35.9 IN ADULT: ICD-10-CM

## 2025-03-03 DIAGNOSIS — F32.A DEPRESSION, UNSPECIFIED DEPRESSION TYPE: Primary | ICD-10-CM

## 2025-03-03 DIAGNOSIS — E66.812 CLASS 2 OBESITY DUE TO EXCESS CALORIES WITHOUT SERIOUS COMORBIDITY WITH BODY MASS INDEX (BMI) OF 35.0 TO 35.9 IN ADULT: ICD-10-CM

## 2025-03-03 PROCEDURE — G8417 CALC BMI ABV UP PARAM F/U: HCPCS | Performed by: CLINICAL NURSE SPECIALIST

## 2025-03-03 PROCEDURE — G8427 DOCREV CUR MEDS BY ELIG CLIN: HCPCS | Performed by: CLINICAL NURSE SPECIALIST

## 2025-03-03 PROCEDURE — 99214 OFFICE O/P EST MOD 30 MIN: CPT | Performed by: CLINICAL NURSE SPECIALIST

## 2025-03-03 PROCEDURE — 1036F TOBACCO NON-USER: CPT | Performed by: CLINICAL NURSE SPECIALIST

## 2025-03-03 PROCEDURE — 99211 OFF/OP EST MAY X REQ PHY/QHP: CPT

## 2025-03-03 RX ORDER — PHENTERMINE HYDROCHLORIDE 37.5 MG/1
TABLET ORAL
Qty: 30 TABLET | Refills: 0 | Status: SHIPPED | OUTPATIENT
Start: 2025-03-03 | End: 2025-04-03

## 2025-03-03 ASSESSMENT — ENCOUNTER SYMPTOMS
BACK PAIN: 1
COUGH: 0
SHORTNESS OF BREATH: 0
NAUSEA: 0
CONSTIPATION: 0
DIARRHEA: 0
VOMITING: 0

## 2025-03-03 NOTE — PROGRESS NOTES
2.4 3.8 3 0.3 70 466   Zucchini 15 1 2.7 1.1 0.4 3 264   Mushroom 28 2.2 5.3 2.2 0.5 2 356   Asparagus 22 2 4.1 2.4 0.2 14 224   Green Beans 35 3.2 7.9 1.9 0.3 1 146   Eggplant 35 2.5 8.7 0.8 0.2 1 123     Return in about 1 month (around 4/3/2025).     Total time spent on encounter:25 minutes     JAMEEL Madrigal - CNS  Obesity  3/3/2025

## 2025-04-09 ENCOUNTER — OFFICE VISIT (OUTPATIENT)
Dept: BARIATRICS/WEIGHT MGMT | Age: 25
End: 2025-04-09
Payer: MEDICAID

## 2025-04-09 VITALS
HEIGHT: 63 IN | WEIGHT: 189.6 LBS | SYSTOLIC BLOOD PRESSURE: 114 MMHG | DIASTOLIC BLOOD PRESSURE: 75 MMHG | TEMPERATURE: 96.8 F | BODY MASS INDEX: 33.59 KG/M2 | HEART RATE: 109 BPM

## 2025-04-09 DIAGNOSIS — E66.09 CLASS 2 OBESITY DUE TO EXCESS CALORIES WITHOUT SERIOUS COMORBIDITY WITH BODY MASS INDEX (BMI) OF 35.0 TO 35.9 IN ADULT: ICD-10-CM

## 2025-04-09 DIAGNOSIS — E66.812 CLASS 2 OBESITY DUE TO EXCESS CALORIES WITHOUT SERIOUS COMORBIDITY WITH BODY MASS INDEX (BMI) OF 35.0 TO 35.9 IN ADULT: ICD-10-CM

## 2025-04-09 DIAGNOSIS — F32.A DEPRESSION, UNSPECIFIED DEPRESSION TYPE: Primary | ICD-10-CM

## 2025-04-09 PROCEDURE — G8427 DOCREV CUR MEDS BY ELIG CLIN: HCPCS | Performed by: CLINICAL NURSE SPECIALIST

## 2025-04-09 PROCEDURE — 1036F TOBACCO NON-USER: CPT | Performed by: CLINICAL NURSE SPECIALIST

## 2025-04-09 PROCEDURE — G8417 CALC BMI ABV UP PARAM F/U: HCPCS | Performed by: CLINICAL NURSE SPECIALIST

## 2025-04-09 PROCEDURE — 99214 OFFICE O/P EST MOD 30 MIN: CPT | Performed by: CLINICAL NURSE SPECIALIST

## 2025-04-09 PROCEDURE — 99211 OFF/OP EST MAY X REQ PHY/QHP: CPT

## 2025-04-09 RX ORDER — PHENTERMINE HYDROCHLORIDE 37.5 MG/1
TABLET ORAL
Qty: 30 TABLET | Refills: 0 | Status: SHIPPED | OUTPATIENT
Start: 2025-04-09 | End: 2025-05-09

## 2025-04-09 ASSESSMENT — ENCOUNTER SYMPTOMS
DIARRHEA: 0
VOMITING: 0
COUGH: 0
SHORTNESS OF BREATH: 0
BACK PAIN: 1
CONSTIPATION: 0
NAUSEA: 0

## 2025-04-09 NOTE — PROGRESS NOTES
Asparagus 22 2 4.1 2.4 0.2 14 224   Green Beans 35 3.2 7.9 1.9 0.3 1 146   Eggplant 35 2.5 8.7 0.8 0.2 1 123     Return in about 1 month (around 5/9/2025).     Total time spent on encounter:25 minutes     JAMEEL Madrigal - CNS  Obesity  4/9/2025

## 2025-07-09 ENCOUNTER — OFFICE VISIT (OUTPATIENT)
Age: 25
End: 2025-07-09
Payer: MEDICAID

## 2025-07-09 VITALS
TEMPERATURE: 98 F | WEIGHT: 174.2 LBS | SYSTOLIC BLOOD PRESSURE: 115 MMHG | DIASTOLIC BLOOD PRESSURE: 60 MMHG | HEART RATE: 77 BPM | BODY MASS INDEX: 30.87 KG/M2 | HEIGHT: 63 IN

## 2025-07-09 DIAGNOSIS — E66.812 CLASS 2 OBESITY DUE TO EXCESS CALORIES WITHOUT SERIOUS COMORBIDITY WITH BODY MASS INDEX (BMI) OF 35.0 TO 35.9 IN ADULT: ICD-10-CM

## 2025-07-09 DIAGNOSIS — E66.09 CLASS 2 OBESITY DUE TO EXCESS CALORIES WITHOUT SERIOUS COMORBIDITY WITH BODY MASS INDEX (BMI) OF 35.0 TO 35.9 IN ADULT: ICD-10-CM

## 2025-07-09 DIAGNOSIS — F32.A DEPRESSION, UNSPECIFIED DEPRESSION TYPE: Primary | ICD-10-CM

## 2025-07-09 PROCEDURE — G8427 DOCREV CUR MEDS BY ELIG CLIN: HCPCS | Performed by: CLINICAL NURSE SPECIALIST

## 2025-07-09 PROCEDURE — 99212 OFFICE O/P EST SF 10 MIN: CPT | Performed by: CLINICAL NURSE SPECIALIST

## 2025-07-09 PROCEDURE — G8417 CALC BMI ABV UP PARAM F/U: HCPCS | Performed by: CLINICAL NURSE SPECIALIST

## 2025-07-09 PROCEDURE — 1036F TOBACCO NON-USER: CPT | Performed by: CLINICAL NURSE SPECIALIST

## 2025-07-09 PROCEDURE — 99213 OFFICE O/P EST LOW 20 MIN: CPT | Performed by: CLINICAL NURSE SPECIALIST

## 2025-07-09 ASSESSMENT — ENCOUNTER SYMPTOMS
VOMITING: 0
COUGH: 0
DIARRHEA: 0
CONSTIPATION: 0
SHORTNESS OF BREATH: 0
BACK PAIN: 1
NAUSEA: 0

## 2025-07-09 NOTE — PROGRESS NOTES
dextrin powder (Benefiber or generic brand). For both of these, start with 1/8th - 1/4th the target amount and every week add another 1/8th - 1/4th until reaching the target).  Also, fiber gummies containing inulin (such as Nature Made, Brewer, Benefiber) or Fiber Choice Pre-biotic tablets containing inulin are options. 1  cup of beans (not green beans, barbeque beans, baked beans or pork and beans) or peas is an excellent food source of fiber. Low calorie, high fiber bread products containing modified wheat starch can be used. An example is the [employer] Xtreme Wellness High Fiber Carb Lean tortilla (7grams in 30 calories).  All of these fiber supplements are for the health of the colon. Their purpose is not to prevent or treat constipation.      Drink at least 64 oz of water each day  Take one multivitamin every day    Targets:  Limit calorie intake to 1400 calories/day  Walk 30 minutes daily  Avoid eating 2 hours within bedtime.     Tips:  Do not eat outside of the dining room or the kitchen  Do not eat while watching TV, videos, working on the computer or using a smart phone  Do not eat food out of a multi-serving bag or container.    Food Resources :    Protein options (20-30 grams protein): 1 cup of low fat cottage cheese (180 roberto/20 grams protein), 6 egg whites + 1 whole egg (170 roberto, 24 grams of protein), 3 oz of chicken (130 roberto, 25 grams protein), low fat, high protein Greek yogurt (240 roberto, 30 grams protein),  4 oz.Tempeh (193 Roberto/ 18 grams protein). 1 cup tofu ( 182 Roberto/ 20 grams protein). 1/2 cup seitan ( 169 Ca/ 30 grams protein)    Principle sources of protein:    Lean meat    Low fat dairy    Egg whites    Beans and legumes     Principle sources of fiber:    Beans (not green beans), legumes and fruit    Examples of fruit are:    Blackberries 144g serving, 62 roberto, 8g fiber    Blueberries 148g serving, 80 roberto, 4g fiber    Strawberries 166g serving, 54 roberto, 3g fiber    Bananas 118g serving,

## 2025-07-09 NOTE — PATIENT INSTRUCTIONS
tablets containing inulin are options. 1  cup of beans (not green beans, barbeque beans, baked beans or pork and beans) or peas is an excellent food source of fiber. Low calorie, high fiber bread products containing modified wheat starch can be used. An example is the [employer] Xtreme Wellness High Fiber Carb Lean tortilla (7grams in 30 calories).  All of these fiber supplements are for the health of the colon. Their purpose is not to prevent or treat constipation.      Drink at least 64 oz of water each day  Take one multivitamin every day    Targets:  Limit calorie intake to 1400 calories/day  Walk 30 minutes daily  Avoid eating 2 hours within bedtime.     Tips:  Do not eat outside of the dining room or the kitchen  Do not eat while watching TV, videos, working on the computer or using a smart phone  Do not eat food out of a multi-serving bag or container.    Food Resources :    Protein options (20-30 grams protein): 1 cup of low fat cottage cheese (180 roberto/20 grams protein), 6 egg whites + 1 whole egg (170 roberto, 24 grams of protein), 3 oz of chicken (130 roberto, 25 grams protein), low fat, high protein Greek yogurt (240 roberto, 30 grams protein),  4 oz.Tempeh (193 Roberto/ 18 grams protein). 1 cup tofu ( 182 Roberto/ 20 grams protein). 1/2 cup seitan ( 169 Ca/ 30 grams protein)    Principle sources of protein:    Lean meat    Low fat dairy    Egg whites    Beans and legumes     Principle sources of fiber:    Beans (not green beans), legumes and fruit    Examples of fruit are:    Blackberries 144g serving, 62 roberto, 8g fiber    Blueberries 148g serving, 80 roberto, 4g fiber    Strawberries 166g serving, 54 roberto, 3g fiber    Bananas 118g serving, 105 roberto, 3g fiber    Gala apples 172g serving, 98 roberto, 4g fiber     Other sources of fiber:    Almonds  28g serving, 170 roberto, 3g fiber    Walnuts 28g serving, 182 roberto, 2g fiber    Pumpkin seeds 31g, 180 roberto, 3g fiber    Sunflower seeds 30g, 180 roberto, 2g fiber      Low calorie non-starchy

## 2025-08-13 ENCOUNTER — HOSPITAL ENCOUNTER (EMERGENCY)
Age: 25
Discharge: HOME | End: 2025-08-13
Payer: MEDICAID

## 2025-08-13 VITALS
BODY MASS INDEX: 31.01 KG/M2 | HEART RATE: 72 BPM | DIASTOLIC BLOOD PRESSURE: 66 MMHG | HEIGHT: 63 IN | RESPIRATION RATE: 16 BRPM | SYSTOLIC BLOOD PRESSURE: 100 MMHG | WEIGHT: 175 LBS | OXYGEN SATURATION: 100 % | TEMPERATURE: 98.7 F

## 2025-08-13 DIAGNOSIS — T18.108A FOREIGN BODY IN ESOPHAGUS, INITIAL ENCOUNTER: ICD-10-CM

## 2025-08-13 DIAGNOSIS — R09.A2 GLOBUS SENSATION: Primary | ICD-10-CM

## 2025-08-13 PROCEDURE — 99284 EMERGENCY DEPT VISIT MOD MDM: CPT

## 2025-08-13 PROCEDURE — 6370000000 HC RX 637 (ALT 250 FOR IP): Performed by: NURSE PRACTITIONER

## 2025-08-13 PROCEDURE — 6360000002 HC RX W HCPCS: Performed by: NURSE PRACTITIONER

## 2025-08-13 PROCEDURE — 96374 THER/PROPH/DIAG INJ IV PUSH: CPT

## 2025-08-13 RX ORDER — METOCLOPRAMIDE HYDROCHLORIDE 5 MG/ML
10 INJECTION INTRAMUSCULAR; INTRAVENOUS ONCE
Status: COMPLETED | OUTPATIENT
Start: 2025-08-13 | End: 2025-08-13

## 2025-08-13 RX ADMIN — LIDOCAINE HYDROCHLORIDE: 20 SOLUTION ORAL at 10:20

## 2025-08-13 RX ADMIN — METOCLOPRAMIDE 10 MG: 5 INJECTION, SOLUTION INTRAMUSCULAR; INTRAVENOUS at 10:23

## 2025-08-13 ASSESSMENT — PAIN DESCRIPTION - PAIN TYPE: TYPE: ACUTE PAIN

## 2025-08-13 ASSESSMENT — PAIN DESCRIPTION - FREQUENCY: FREQUENCY: CONTINUOUS

## 2025-08-13 ASSESSMENT — PAIN SCALES - GENERAL
PAINLEVEL_OUTOF10: 4
PAINLEVEL_OUTOF10: 0

## 2025-08-13 ASSESSMENT — PAIN - FUNCTIONAL ASSESSMENT
PAIN_FUNCTIONAL_ASSESSMENT: 0-10
PAIN_FUNCTIONAL_ASSESSMENT: 0-10

## 2025-08-13 ASSESSMENT — PAIN DESCRIPTION - LOCATION: LOCATION: THROAT

## 2025-08-13 ASSESSMENT — PAIN DESCRIPTION - ONSET: ONSET: ON-GOING

## 2025-08-13 ASSESSMENT — PAIN DESCRIPTION - DESCRIPTORS: DESCRIPTORS: DISCOMFORT
